# Patient Record
Sex: FEMALE | Race: WHITE | Employment: OTHER | URBAN - METROPOLITAN AREA
[De-identification: names, ages, dates, MRNs, and addresses within clinical notes are randomized per-mention and may not be internally consistent; named-entity substitution may affect disease eponyms.]

---

## 2022-08-24 ENCOUNTER — TELEPHONE (OUTPATIENT)
Dept: NEUROLOGY | Age: 77
End: 2022-08-24

## 2022-08-24 ENCOUNTER — OFFICE VISIT (OUTPATIENT)
Dept: NEUROLOGY | Age: 77
End: 2022-08-24
Payer: MEDICARE

## 2022-08-24 VITALS
HEIGHT: 66 IN | HEART RATE: 71 BPM | SYSTOLIC BLOOD PRESSURE: 135 MMHG | DIASTOLIC BLOOD PRESSURE: 76 MMHG | BODY MASS INDEX: 27.8 KG/M2 | OXYGEN SATURATION: 97 % | TEMPERATURE: 97.8 F | RESPIRATION RATE: 18 BRPM | WEIGHT: 173 LBS

## 2022-08-24 DIAGNOSIS — R55 SYNCOPE AND COLLAPSE: ICD-10-CM

## 2022-08-24 DIAGNOSIS — G90.9 ANS (AUTONOMIC NERVOUS SYSTEM) DISEASE: ICD-10-CM

## 2022-08-24 DIAGNOSIS — R40.20 LOC (LOSS OF CONSCIOUSNESS) (HCC): Primary | ICD-10-CM

## 2022-08-24 PROCEDURE — 1090F PRES/ABSN URINE INCON ASSESS: CPT | Performed by: PSYCHIATRY & NEUROLOGY

## 2022-08-24 PROCEDURE — G8417 CALC BMI ABV UP PARAM F/U: HCPCS | Performed by: PSYCHIATRY & NEUROLOGY

## 2022-08-24 PROCEDURE — G8536 NO DOC ELDER MAL SCRN: HCPCS | Performed by: PSYCHIATRY & NEUROLOGY

## 2022-08-24 PROCEDURE — G8510 SCR DEP NEG, NO PLAN REQD: HCPCS | Performed by: PSYCHIATRY & NEUROLOGY

## 2022-08-24 PROCEDURE — G8399 PT W/DXA RESULTS DOCUMENT: HCPCS | Performed by: PSYCHIATRY & NEUROLOGY

## 2022-08-24 PROCEDURE — G8427 DOCREV CUR MEDS BY ELIG CLIN: HCPCS | Performed by: PSYCHIATRY & NEUROLOGY

## 2022-08-24 PROCEDURE — 1101F PT FALLS ASSESS-DOCD LE1/YR: CPT | Performed by: PSYCHIATRY & NEUROLOGY

## 2022-08-24 PROCEDURE — 1123F ACP DISCUSS/DSCN MKR DOCD: CPT | Performed by: PSYCHIATRY & NEUROLOGY

## 2022-08-24 PROCEDURE — 99204 OFFICE O/P NEW MOD 45 MIN: CPT | Performed by: PSYCHIATRY & NEUROLOGY

## 2022-08-24 RX ORDER — ELECTROLYTES/DEXTROSE
SOLUTION, ORAL ORAL
COMMUNITY

## 2022-08-24 RX ORDER — ATORVASTATIN CALCIUM 10 MG/1
TABLET, FILM COATED ORAL
COMMUNITY
Start: 2022-07-27

## 2022-08-24 RX ORDER — ACETAMINOPHEN 500 MG
TABLET ORAL DAILY
COMMUNITY

## 2022-08-24 NOTE — PATIENT INSTRUCTIONS
RESULT POLICY      If we have ordered testing for you, know that; \"NO NEWS IS GOOD NEWS! \"     It is our policy that we no longer call patients with results, nor do we  give test results over the phone. We schedule follow up appointments so that your results can be discussed in person. This allows you to address any questions you have regarding the results. If you choose to go to an imaging center outside of [facility], it is your responsibility to bring imaging report and disc to follow up appointment. If something of concern is revealed on your test, we will contact you to discuss the matter and if needed schedule a sooner follow up appointment. Additionally, results may be found by using the My Chart feature and one of our patient service representatives at the  can give you instructions on how to access this feature to utilize our electronic medical record system. Thank you for your understanding. [facility]   Statement to Patients  April 1, 2014      In an effort to ensure the large volume of patient prescription refills is processed in the most efficient and expeditious manner, we are asking our patients to assist us by calling your Pharmacy for all prescription refills, this will include also your  Mail Order Pharmacy. The pharmacy will contact our office electronically to continue the refill process. Please do not wait until the last minute to call your pharmacy. We need at least 48 hours (2days) to fill prescriptions. We also encourage you to call your pharmacy before going to  your prescription to make sure it is ready. With regard to controlled substance prescription refill requests (narcotic refills) that need to be picked up at our office, we ask your cooperation by providing us with at least 72 hours (3days) notice that you will need a refill.     We will not refill narcotic prescription refill requests after 4:00pm on any weekday, Monday through Thursday, or after 2:00pm on Fridays, or on the weekends. We encourage everyone to explore another way of getting your prescription refill request processed using [employer], our patient web portal through our electronic medical record system. [employer] is an efficient and effective way to communicate your medication request directly to the office and  downloadable as an jessica on your smart phone . [employer] also features a review functionality that allows you to view your medication list as well as leave messages for your physician. Are you ready to get connected? If so please review the attatched instructions or speak to any of our staff to get you set up right away! Thank you so much for your cooperation. Should you have any questions please contact our Practice Administrator.
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
## 2022-09-02 ENCOUNTER — HOSPITAL ENCOUNTER (OUTPATIENT)
Dept: MRI IMAGING | Age: 77
Discharge: HOME | End: 2022-09-02
Attending: PSYCHIATRY & NEUROLOGY
Payer: MEDICARE

## 2022-09-02 DIAGNOSIS — R40.20 LOC (LOSS OF CONSCIOUSNESS) (HCC): ICD-10-CM

## 2022-09-02 DIAGNOSIS — G90.9 ANS (AUTONOMIC NERVOUS SYSTEM) DISEASE: ICD-10-CM

## 2022-09-02 DIAGNOSIS — R55 SYNCOPE AND COLLAPSE: ICD-10-CM

## 2022-09-02 PROCEDURE — 74011250636 HC RX REV CODE- 250/636: Performed by: PSYCHIATRY & NEUROLOGY

## 2022-09-02 PROCEDURE — 70553 MRI BRAIN STEM W/O & W/DYE: CPT

## 2022-09-02 PROCEDURE — A9576 INJ PROHANCE MULTIPACK: HCPCS | Performed by: PSYCHIATRY & NEUROLOGY

## 2022-09-02 RX ADMIN — GADOTERIDOL 15 ML: 279.3 INJECTION, SOLUTION INTRAVENOUS at 12:48

## 2022-09-12 ENCOUNTER — OFFICE VISIT (OUTPATIENT)
Dept: NEUROLOGY | Age: 77
End: 2022-09-12
Payer: MEDICARE

## 2022-09-12 DIAGNOSIS — R55 SYNCOPE AND COLLAPSE: Primary | ICD-10-CM

## 2022-09-12 PROCEDURE — 95923 AUTONOMIC NRV SYST FUNJ TEST: CPT | Performed by: PSYCHIATRY & NEUROLOGY

## 2022-09-12 PROCEDURE — 95924 ANS PARASYMP & SYMP W/TILT: CPT | Performed by: PSYCHIATRY & NEUROLOGY

## 2022-09-21 ENCOUNTER — OFFICE VISIT (OUTPATIENT)
Dept: NEUROLOGY | Age: 77
End: 2022-09-21

## 2022-09-21 DIAGNOSIS — R55 SYNCOPE AND COLLAPSE: Primary | ICD-10-CM

## 2022-10-06 ENCOUNTER — TELEPHONE (OUTPATIENT)
Dept: NEUROLOGY | Age: 77
End: 2022-10-06

## 2022-10-06 NOTE — TELEPHONE ENCOUNTER
Patient would like a call form the nurse or doctor regarding her going away on October 25 th the day after her follow up appointment. She would like to know ahead of time should she fly by herself.     Please contact

## 2022-10-08 NOTE — PROCEDURES
[facility]   EEG Report    Procedure ID: [id number] Procedure Date: 9/21/2022   Patient Name: Steffany Fleming YOB: 1945   Procedure Type: Routine Medical Record No: [id number]       An EEG is requested in this 65-year-old lady to evaluate for epileptiform abnormalities. Medication listed as Lipitor, biotin and vitamin D    This tracing is obtained during the awake state. During wakefulness there are intermittent runs of posteriorly dominant and symmetric low to medium amplitude 9 cps activities which attenuate with eye opening. Lower voltage faster frequency activities are seen symmetrically over the anterior head regions. Hyperventilation is not performed    Intermittent photic stimulation little alters the tracing. Sleep is not attained.     Interpretation  This EEG recorded during the awake state is normal.        Conan Lesch, MD

## 2022-10-10 NOTE — TELEPHONE ENCOUNTER
Patient is calling back to see when will she get a call back regarding he flying In an airplane.     Please contact

## 2022-10-24 ENCOUNTER — OFFICE VISIT (OUTPATIENT)
Dept: NEUROLOGY | Age: 77
End: 2022-10-24
Payer: MEDICARE

## 2022-10-24 VITALS
OXYGEN SATURATION: 98 % | SYSTOLIC BLOOD PRESSURE: 120 MMHG | DIASTOLIC BLOOD PRESSURE: 82 MMHG | WEIGHT: 173 LBS | BODY MASS INDEX: 27.92 KG/M2 | HEART RATE: 83 BPM

## 2022-10-24 DIAGNOSIS — R40.20 LOC (LOSS OF CONSCIOUSNESS) (HCC): Primary | ICD-10-CM

## 2022-10-24 PROCEDURE — G8417 CALC BMI ABV UP PARAM F/U: HCPCS | Performed by: NURSE PRACTITIONER

## 2022-10-24 PROCEDURE — 99214 OFFICE O/P EST MOD 30 MIN: CPT | Performed by: NURSE PRACTITIONER

## 2022-10-24 PROCEDURE — G8427 DOCREV CUR MEDS BY ELIG CLIN: HCPCS | Performed by: NURSE PRACTITIONER

## 2022-10-24 PROCEDURE — 1123F ACP DISCUSS/DSCN MKR DOCD: CPT | Performed by: NURSE PRACTITIONER

## 2022-10-24 PROCEDURE — G8536 NO DOC ELDER MAL SCRN: HCPCS | Performed by: NURSE PRACTITIONER

## 2022-10-24 PROCEDURE — G8399 PT W/DXA RESULTS DOCUMENT: HCPCS | Performed by: NURSE PRACTITIONER

## 2022-10-24 PROCEDURE — 1101F PT FALLS ASSESS-DOCD LE1/YR: CPT | Performed by: NURSE PRACTITIONER

## 2022-10-24 PROCEDURE — 1090F PRES/ABSN URINE INCON ASSESS: CPT | Performed by: NURSE PRACTITIONER

## 2022-10-24 PROCEDURE — G8432 DEP SCR NOT DOC, RNG: HCPCS | Performed by: NURSE PRACTITIONER

## 2022-10-24 NOTE — PROGRESS NOTES
No changes in symptoms   Wanted to mention that her eyes are doing a random motions, shaking back and forth will only be one eye

## 2022-10-25 NOTE — PROGRESS NOTES
Hemant Vences is a 65 y.o. female who presents with the following  Chief Complaint   Patient presents with    Follow-up    Results       HPI      Follow-up for MRI of the brain  EEG  Carotid Doppler  ANS testing    She has not had any other syncopal episodes or dizziness or passing out since early in the summer  She is still following with cardiology with nothing has been found  She feels great today  She is traveling tomorrow  No concerns right now    No Known Allergies    Current Outpatient Medications   Medication Sig    atorvastatin (LIPITOR) 10 mg tablet     biotin (VITAMIN B7) 5 mg capsule Take  by mouth. cholecalciferol (VITAMIN D3) (2,000 UNITS /50 MCG) cap capsule Take  by mouth daily. No current facility-administered medications for this visit. Social History     Tobacco Use   Smoking Status Never   Smokeless Tobacco Never       Past Medical History:   Diagnosis Date    Basal cell carcinoma (BCC) in situ of skin     High cholesterol     Hypertension        Past Surgical History:   Procedure Laterality Date    HX COLONOSCOPY  2021       Family History   Problem Relation Age of Onset    Heart Disease Mother     Throat cancer Father     Emphysema Father     Breast Cancer Sister     Colon Cancer Sister     Cancer Sister         tonsils    Cancer Sister         tonsils    Hypertension Brother     SKIN CANCER Brother        Social History     Socioeconomic History    Marital status:    Tobacco Use    Smoking status: Never    Smokeless tobacco: Never   Vaping Use    Vaping Use: Never used   Substance and Sexual Activity    Alcohol use: Never    Drug use: Never       Review of Systems   Eyes:  Negative for blurred vision, double vision and photophobia. Gastrointestinal:  Negative for nausea and vomiting. Musculoskeletal:  Negative for back pain, falls and joint pain. Neurological:  Negative for dizziness, tingling, tremors, seizures, loss of consciousness and headaches.      Remainder of comprehensive review is negative. Physical Exam :    Visit Vitals  /82 (BP 1 Location: Left upper arm, BP Patient Position: Sitting, BP Cuff Size: Adult)   Pulse 83   Wt 78.5 kg (173 lb)   SpO2 98%   BMI 27.92 kg/m²         No results found for this or any previous visit. No orders of the defined types were placed in this encounter.       1. LOC (loss of consciousness) ([address]          MRI of the brain, EEG, Doppler, ANS all normal  We did discuss small fiber neuropathy on ANS though and she has no concerns about this as this is not the cause this was an incidental finding  She has not had a syncopal episode in months  She has been following with cardiology with no updates   We will reevaluate with further testing if another comes            This note will not be viewable in [facility]

## 2023-01-09 ENCOUNTER — TELEPHONE (OUTPATIENT)
Dept: NEUROLOGY | Age: 78
End: 2023-01-09

## 2023-01-09 DIAGNOSIS — R55 SYNCOPE AND COLLAPSE: ICD-10-CM

## 2023-01-09 DIAGNOSIS — R40.20 LOC (LOSS OF CONSCIOUSNESS) (HCC): Primary | ICD-10-CM

## 2023-01-09 NOTE — TELEPHONE ENCOUNTER
Patient called re:short reoccuring of \"blackout/losing consciouness \"for a very, very short period of time. Pls call patient concerning this issue. Thank you.

## 2023-01-23 ENCOUNTER — HOSPITAL ENCOUNTER (OUTPATIENT)
Age: 78
Setting detail: OBSERVATION
LOS: 1 days | Discharge: HOME | End: 2023-01-26
Attending: PSYCHIATRY & NEUROLOGY | Admitting: PSYCHIATRY & NEUROLOGY
Payer: MEDICARE

## 2023-01-23 ENCOUNTER — APPOINTMENT (OUTPATIENT)
Dept: NEUROLOGY | Age: 78
End: 2023-01-23
Payer: MEDICARE

## 2023-01-23 PROBLEM — R40.4 SPELL OF ALTERED CONSCIOUSNESS: Status: ACTIVE | Noted: 2023-01-23

## 2023-01-23 LAB
ALBUMIN SERPL-MCNC: 3.7 G/DL (ref 3.5–5)
ALBUMIN/GLOB SERPL: 1.2 (ref 1.1–2.2)
ALP SERPL-CCNC: 81 U/L (ref 45–117)
ALT SERPL-CCNC: 25 U/L (ref 12–78)
AMPHET UR QL SCN: NEGATIVE
ANION GAP SERPL CALC-SCNC: 5 MMOL/L (ref 5–15)
AST SERPL-CCNC: 14 U/L (ref 15–37)
BARBITURATES UR QL SCN: NEGATIVE
BASOPHILS # BLD: 0 K/UL (ref 0–0.1)
BASOPHILS NFR BLD: 0 % (ref 0–1)
BENZODIAZ UR QL: NEGATIVE
BILIRUB SERPL-MCNC: 0.3 MG/DL (ref 0.2–1)
BUN SERPL-MCNC: 17 MG/DL (ref 6–20)
BUN/CREAT SERPL: 20 (ref 12–20)
CALCIUM SERPL-MCNC: 9.9 MG/DL (ref 8.5–10.1)
CANNABINOIDS UR QL SCN: NEGATIVE
CHLORIDE SERPL-SCNC: 111 MMOL/L (ref 97–108)
CO2 SERPL-SCNC: 25 MMOL/L (ref 21–32)
COCAINE UR QL SCN: NEGATIVE
CREAT SERPL-MCNC: 0.83 MG/DL (ref 0.55–1.02)
DIFFERENTIAL METHOD BLD: NORMAL
DRUG SCRN COMMENT,DRGCM: NORMAL
EOSINOPHIL # BLD: 0.1 K/UL (ref 0–0.4)
EOSINOPHIL NFR BLD: 2 % (ref 0–7)
ERYTHROCYTE [DISTWIDTH] IN BLOOD BY AUTOMATED COUNT: 11.9 % (ref 11.5–14.5)
GLOBULIN SER CALC-MCNC: 3.2 G/DL (ref 2–4)
GLUCOSE SERPL-MCNC: 87 MG/DL (ref 65–100)
HCT VFR BLD AUTO: 41.9 % (ref 35–47)
HGB BLD-MCNC: 13.9 G/DL (ref 11.5–16)
IMM GRANULOCYTES # BLD AUTO: 0 K/UL (ref 0–0.04)
IMM GRANULOCYTES NFR BLD AUTO: 0 % (ref 0–0.5)
LYMPHOCYTES # BLD: 1.2 K/UL (ref 0.8–3.5)
LYMPHOCYTES NFR BLD: 23 % (ref 12–49)
MCH RBC QN AUTO: 31.2 PG (ref 26–34)
MCHC RBC AUTO-ENTMCNC: 33.2 G/DL (ref 30–36.5)
MCV RBC AUTO: 93.9 FL (ref 80–99)
METHADONE UR QL: NEGATIVE
MONOCYTES # BLD: 0.5 K/UL (ref 0–1)
MONOCYTES NFR BLD: 10 % (ref 5–13)
NEUTS SEG # BLD: 3.5 K/UL (ref 1.8–8)
NEUTS SEG NFR BLD: 65 % (ref 32–75)
NRBC # BLD: 0 K/UL (ref 0–0.01)
NRBC BLD-RTO: 0 PER 100 WBC
OPIATES UR QL: NEGATIVE
PCP UR QL: NEGATIVE
PLATELET # BLD AUTO: 248 K/UL (ref 150–400)
PMV BLD AUTO: 10.6 FL (ref 8.9–12.9)
POTASSIUM SERPL-SCNC: 4 MMOL/L (ref 3.5–5.1)
PROT SERPL-MCNC: 6.9 G/DL (ref 6.4–8.2)
RBC # BLD AUTO: 4.46 M/UL (ref 3.8–5.2)
SODIUM SERPL-SCNC: 141 MMOL/L (ref 136–145)
WBC # BLD AUTO: 5.4 K/UL (ref 3.6–11)

## 2023-01-23 PROCEDURE — 36415 COLL VENOUS BLD VENIPUNCTURE: CPT

## 2023-01-23 PROCEDURE — 80053 COMPREHEN METABOLIC PANEL: CPT

## 2023-01-23 PROCEDURE — G0378 HOSPITAL OBSERVATION PER HR: HCPCS

## 2023-01-23 PROCEDURE — 99223 1ST HOSP IP/OBS HIGH 75: CPT | Performed by: PSYCHIATRY & NEUROLOGY

## 2023-01-23 PROCEDURE — 95714 VEEG EA 12-26 HR UNMNTR: CPT | Performed by: PSYCHIATRY & NEUROLOGY

## 2023-01-23 PROCEDURE — 85025 COMPLETE CBC W/AUTO DIFF WBC: CPT

## 2023-01-23 PROCEDURE — 74011000250 HC RX REV CODE- 250: Performed by: PSYCHIATRY & NEUROLOGY

## 2023-01-23 PROCEDURE — 80307 DRUG TEST PRSMV CHEM ANLYZR: CPT

## 2023-01-23 PROCEDURE — 95720 EEG PHY/QHP EA INCR W/VEEG: CPT | Performed by: PSYCHIATRY & NEUROLOGY

## 2023-01-23 RX ORDER — ACETAMINOPHEN 325 MG/1
650 TABLET ORAL
Status: DISCONTINUED | OUTPATIENT
Start: 2023-01-23 | End: 2023-01-26 | Stop reason: HOSPADM

## 2023-01-23 RX ORDER — LORAZEPAM 2 MG/ML
2 INJECTION, SOLUTION INTRAMUSCULAR; INTRAVENOUS
Status: DISCONTINUED | OUTPATIENT
Start: 2023-01-23 | End: 2023-01-26 | Stop reason: HOSPADM

## 2023-01-23 RX ORDER — MELATONIN
2000 DAILY
Status: DISCONTINUED | OUTPATIENT
Start: 2023-01-24 | End: 2023-01-26 | Stop reason: HOSPADM

## 2023-01-23 RX ORDER — ATORVASTATIN CALCIUM 10 MG/1
10 TABLET, FILM COATED ORAL DAILY
Status: DISCONTINUED | OUTPATIENT
Start: 2023-01-24 | End: 2023-01-26 | Stop reason: HOSPADM

## 2023-01-23 RX ORDER — SODIUM CHLORIDE 0.9 % (FLUSH) 0.9 %
5-40 SYRINGE (ML) INJECTION AS NEEDED
Status: DISCONTINUED | OUTPATIENT
Start: 2023-01-23 | End: 2023-01-26 | Stop reason: HOSPADM

## 2023-01-23 RX ORDER — SODIUM CHLORIDE 0.9 % (FLUSH) 0.9 %
5-40 SYRINGE (ML) INJECTION EVERY 8 HOURS
Status: DISCONTINUED | OUTPATIENT
Start: 2023-01-23 | End: 2023-01-26 | Stop reason: HOSPADM

## 2023-01-23 RX ORDER — ENOXAPARIN SODIUM 100 MG/ML
40 INJECTION SUBCUTANEOUS EVERY 24 HOURS
Status: DISCONTINUED | OUTPATIENT
Start: 2023-01-23 | End: 2023-01-26 | Stop reason: HOSPADM

## 2023-01-23 RX ADMIN — SODIUM CHLORIDE, PRESERVATIVE FREE 10 ML: 5 INJECTION INTRAVENOUS at 22:00

## 2023-01-23 RX ADMIN — SODIUM CHLORIDE, PRESERVATIVE FREE 10 ML: 5 INJECTION INTRAVENOUS at 18:50
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
## 2023-01-23 NOTE — PROGRESS NOTES
[address] [facility],[address]      Herbal and Nutritional Product Restrictions       The following herbal, alternative, and/or nutritional/dietary supplement product(s) has been discontinued per [employer] approved policy:       Biotin 5 mg     Please reorder upon discharge if appropriate.      Thank you, KILLIAN Seay    1/23/2023 11:44 AM

## 2023-01-23 NOTE — PROGRESS NOTES
Problem: Discharge Planning  Goal: *Discharge to safe environment  Outcome: Progressing Towards Goal  Goal: *Knowledge of medication management  Outcome: Progressing Towards Goal  Goal: *Knowledge of discharge instructions  Outcome: Progressing Towards Goal     Problem: Patient Education: Go to Patient Education Activity  Goal: Patient/Family Education  Outcome: Progressing Towards Goal     Problem: Pain  Goal: *Control of Pain  Outcome: Progressing Towards Goal  Goal: *PALLIATIVE CARE:  Alleviation of Pain  Outcome: Progressing Towards Goal     Problem: Patient Education: Go to Patient Education Activity  Goal: Patient/Family Education  Outcome: Progressing Towards Goal     Problem: Seizure Disorder (Adult)  Goal: *STG: Remains free of seizure activity  Outcome: Progressing Towards Goal  Goal: *STG: Maintains lab values within therapeutic range  Outcome: Progressing Towards Goal  Goal: *STG/LTG: Complies with medication therapy  Outcome: Progressing Towards Goal  Goal: *STG: Remains free of injury during seizure activity  Outcome: Progressing Towards Goal  Goal: *STG: Remains safe in hospital  Outcome: Progressing Towards Goal  Goal: Interventions  Outcome: Progressing Towards Goal     Problem: Patient Education: Go to Patient Education Activity  Goal: Patient/Family Education  Outcome: Progressing Towards Goal

## 2023-01-24 PROCEDURE — 99231 SBSQ HOSP IP/OBS SF/LOW 25: CPT | Performed by: PSYCHIATRY & NEUROLOGY

## 2023-01-24 PROCEDURE — G0378 HOSPITAL OBSERVATION PER HR: HCPCS

## 2023-01-24 PROCEDURE — 95714 VEEG EA 12-26 HR UNMNTR: CPT | Performed by: PSYCHIATRY & NEUROLOGY

## 2023-01-24 PROCEDURE — 74011000250 HC RX REV CODE- 250: Performed by: PSYCHIATRY & NEUROLOGY

## 2023-01-24 PROCEDURE — 74011250637 HC RX REV CODE- 250/637: Performed by: PSYCHIATRY & NEUROLOGY

## 2023-01-24 PROCEDURE — 95720 EEG PHY/QHP EA INCR W/VEEG: CPT | Performed by: PSYCHIATRY & NEUROLOGY

## 2023-01-24 RX ADMIN — Medication 2000 UNITS: at 09:32

## 2023-01-24 RX ADMIN — SODIUM CHLORIDE, PRESERVATIVE FREE 10 ML: 5 INJECTION INTRAVENOUS at 14:08

## 2023-01-24 RX ADMIN — ATORVASTATIN CALCIUM 10 MG: 10 TABLET, FILM COATED ORAL at 09:32

## 2023-01-24 RX ADMIN — SODIUM CHLORIDE, PRESERVATIVE FREE 10 ML: 5 INJECTION INTRAVENOUS at 06:37

## 2023-01-24 NOTE — PROGRESS NOTES
Provided pastoral care visit to [facility] patient. Did not include sacramental care.      Pravin Mcbride

## 2023-01-24 NOTE — PROGRESS NOTES
Problem: Discharge Planning  Goal: *Knowledge of medication management  Outcome: Progressing Towards Goal  Goal: *Knowledge of discharge instructions  Outcome: Progressing Towards Goal     Problem: Patient Education: Go to Patient Education Activity  Goal: Patient/Family Education  Outcome: Progressing Towards Goal     Problem: Pain  Goal: *Control of Pain  Outcome: Progressing Towards Goal

## 2023-01-24 NOTE — PROGRESS NOTES
Neurology Progress Note     NAME: Connor Callaway   :  [id number]   MRN:  [id number]   DATE:  2023    Assessment:     Active Problems:    Spell of altered consciousness (2023)      Pt is a 67yo RH female with recurrent brief spells of RIK/LOC beginning in , +family h/o sz in her neice. 24 hour ambulatory EEG 22 was normal. MRI brain w/wo 22 was unremarkable. Pt is admitted to the EMU for continuous video EEG monitoring with cardiac monitoring in order to capture a spell and make a definitive diagnosis. Exam is non-focal.   No spells on day 1 in EMU, interictal EEG is unremarkable. CMP, CBC with diff and UDS all unremarkable. Spells of RIK/LOC concerning for seizure vs NATHANAEL such as arrhythmia. Plan:   -Continue video EEG monitoring  -Continue sleep deprivation through dinner on day 2    Subjective:    Pt is doing well. No complaints. No spells.     Objective:   Chart reviewed since last seen    Current Facility-Administered Medications   Medication Dose Route Frequency    sodium chloride (NS) flush 5-40 mL  5-40 mL IntraVENous Q8H    sodium chloride (NS) flush 5-40 mL  5-40 mL IntraVENous PRN    LORazepam (ATIVAN) 2 mg/mL injection 2 mg  2 mg IntraVENous Q5MIN PRN    LORazepam (ATIVAN) 2 mg/mL injection 2 mg  2 mg IntraMUSCular Q5MIN PRN    acetaminophen (TYLENOL) tablet 650 mg  650 mg Oral Q4H PRN    enoxaparin (LOVENOX) injection 40 mg  40 mg SubCUTAneous Q24H    atorvastatin (LIPITOR) tablet 10 mg  10 mg Oral DAILY    cholecalciferol (VITAMIN D3) (1000 Units /25 mcg) tablet 2,000 Units  2,000 Units Oral DAILY       Visit Vitals  /75 (BP 1 Location: Left upper arm, BP Patient Position: Sitting)   Pulse 72   Temp 98.1 °F (36.7 °C)   Resp 15   Wt 171 lb (77.6 kg)   SpO2 99%   BMI 27.60 kg/m²     Temp (24hrs), Av.3 °F (36.8 °C), Min:97.9 °F (36.6 °C), Max:98.7 °F (37.1 °C)      No intake/output data recorded. 01/22 1901 - 01/24 0700  In: 240 [P.O.:240]  Out: -       Physical Exam:  General: Well developed well nourished patient in no apparent distress. Cardiac: Regular rate and rhythm     Neurological Exam:  Mental Status: Oriented to time, place and person. Speech and language intact. Attention and fund of knowledge appropriate. Normal recent and remote memory. Cranial Nerves:   EOMI, no nystagmus, no ptosis. Facial movement is symmetric. Hearing is intact   Motor:     Reflexes:      Sensory:      Gait:     Cerebellar:           Lab Review No results found for this or any previous visit (from the past 24 hour(s)). Imaging Review   MRI Results (most recent):  Results from Hospital Encounter encounter on 09/02/22    MRI BRAIN W WO CONT    Narrative  Clinical history: Recurrent episodes of syncope  INDICATION:   Recurrent episodes of syncope    COMPARISON: None  TECHNIQUE: MR examination of the brain includes axial and sagittal T1 , axial  T2, axial FLAIR, axial gradient echo, axial DWI, coronal T1 . Pre and post  contrast axial T1-weighted imaging. Postcontrast T1-weighted imaging coronal  plane. CONTRAST: 20 ml ProHance  FINDINGS:  There is no intracranial mass, hemorrhage or acute infarction. There are confluent periventricular and scattered foci of increased T2 signal  intensity demonstrated in the corona radiata, centrum semiovale and central  mary beth. There is minimal sulcal and ventricular prominence. There is no abnormal parenchymal enhancement. There is no abnormal meningeal  enhancement demonstrated. The brain architecture is normal. There is no evidence  of midline shift or mass-effect. The ventricles are normal in size, position and  configuration. There are no extra-axial fluid collections. Major intracranial  vascular flow-voids are unremarkable. The orbits are grossly symmetric.  Dural  venous sinuses are grossly unremarkable. There is no Chiari or syrinx. Pituitary  and infundibulum grossly unremarkable. Cerebellopontine angles are unremarkable. No skull base mass is identified. Cavernous sinuses are symmetric. The mastoid  air cells and are well pneumatized and clear. Impression  Mild/minimal chronic microvascular ischemic change and minimal cerebral atrophy. There is no acute intracranial process. No intracranial mass, hemorrhage or evidence of acute infarction. CT Results (most recent):  No results found for this or any previous visit. Care Plan discussed with:  Patient x   Family    RN    Care Manager    Consultant/Specialist:       Signed: Cristhian Zaragoza MD

## 2023-01-25 PROCEDURE — 74011250637 HC RX REV CODE- 250/637: Performed by: PSYCHIATRY & NEUROLOGY

## 2023-01-25 PROCEDURE — 95714 VEEG EA 12-26 HR UNMNTR: CPT | Performed by: PSYCHIATRY & NEUROLOGY

## 2023-01-25 PROCEDURE — G0378 HOSPITAL OBSERVATION PER HR: HCPCS

## 2023-01-25 PROCEDURE — 95720 EEG PHY/QHP EA INCR W/VEEG: CPT | Performed by: PSYCHIATRY & NEUROLOGY

## 2023-01-25 PROCEDURE — 74011000250 HC RX REV CODE- 250: Performed by: PSYCHIATRY & NEUROLOGY

## 2023-01-25 PROCEDURE — 99231 SBSQ HOSP IP/OBS SF/LOW 25: CPT | Performed by: PSYCHIATRY & NEUROLOGY

## 2023-01-25 RX ADMIN — SODIUM CHLORIDE, PRESERVATIVE FREE 10 ML: 5 INJECTION INTRAVENOUS at 06:45

## 2023-01-25 RX ADMIN — ATORVASTATIN CALCIUM 10 MG: 10 TABLET, FILM COATED ORAL at 09:26

## 2023-01-25 RX ADMIN — SODIUM CHLORIDE, PRESERVATIVE FREE 10 ML: 5 INJECTION INTRAVENOUS at 22:00

## 2023-01-25 RX ADMIN — SODIUM CHLORIDE, PRESERVATIVE FREE 10 ML: 5 INJECTION INTRAVENOUS at 13:46

## 2023-01-25 RX ADMIN — Medication 2000 UNITS: at 09:26

## 2023-01-25 NOTE — PROGRESS NOTES
Spiritual Care Assessment/Progress Note  [facility]      NAME: Angeline Pineda      MRN: [id number]  AGE: 65 y.o.  SEX: female  Buddhist Affiliation: Adventism   Language: English     1/25/2023     Total Time (in minutes): 5     Spiritual Assessment begun in [address] through conversation with:         [x]Patient        [] Family    [] Friend(s)        Reason for Consult: [facility]     Spiritual beliefs: (Please include comment if needed)     [x] Identifies with a alphonse tradition: Adventism        [] Supported by a alphonse community:            [] Claims no spiritual orientation:           [] Seeking spiritual identity:                [] Adheres to an individual form of spirituality:           [] Not able to assess:                           Identified resources for coping:      [x] Prayer                               [x] Music                  [] Guided Imagery     [x] Family/friends                 [] Pet visits     [] Devotional reading                         [] Unknown     [] Other:                                               Interventions offered during this visit: (See comments for more details)    Patient Interventions: Affirmation of alphonse, Communion (Adventism), Initial/Spiritual assessment, patient floor, Prayer (actual), Prayer (assurance of)           Plan of Care:     [x] Support spiritual and/or cultural needs    [] Support AMD and/or advance care planning process      [] Support grieving process   [] Coordinate Rites and/or Rituals    [] Coordination with community clergy   [] No spiritual needs identified at this time   [] Detailed Plan of Care below (See Comments)  [] Make referral to Music Therapy  [] Make referral to Pet Therapy     [] Make referral to Addiction services  [] Make referral to [facility]  [] Make referral to Spiritual Care Partner  [] No future visits requested        [] Contact Spiritual Care for further referrals     Comments: Mrs. Milena Wright was sitting up in her chair and in conversation with the nurse. She declined communion today. She is waiting for the doctor and hoping to go home this afternoon.     RENNY Tillman, RN, ACSW, LCSW   Page:  [phone]

## 2023-01-25 NOTE — PROGRESS NOTES
Problem: Seizure Disorder (Adult)  Goal: *STG: Remains free of seizure activity  Outcome: Progressing Towards Goal  Goal: *STG: Maintains lab values within therapeutic range  Outcome: Progressing Towards Goal  Goal: *STG/LTG: Complies with medication therapy  Outcome: Progressing Towards Goal  Goal: *STG: Remains free of injury during seizure activity  Outcome: Progressing Towards Goal  Goal: *STG: Remains safe in hospital  Outcome: Progressing Towards Goal  Goal: Interventions  Outcome: Progressing Towards Goal     Problem: Patient Education: Go to Patient Education Activity  Goal: Patient/Family Education  Outcome: Progressing Towards Goal     Problem: Falls - Risk of  Goal: *Absence of Falls  Description: Document Huseyin Fall Risk and appropriate interventions in the flowsheet.   Outcome: Progressing Towards Goal  Note: Fall Risk Interventions:                                Problem: Patient Education: Go to Patient Education Activity  Goal: Patient/Family Education  Outcome: Progressing Towards Goal

## 2023-01-25 NOTE — PROGRESS NOTES
Bedside and Verbal shift change report given to [facility] (oncoming nurse) by Carmela Merritt RN (offgoing nurse). Report included the following information Kardex and Recent Results.

## 2023-01-25 NOTE — PROGRESS NOTES
0000 Bedside and Verbal shift change report given to Elvin (oncoming nurse) by Charisma Sweeney (offgoing nurse). Report included the following information SBAR, Kardex, and MAR.     0730 Bedside and Verbal shift change report given to Krystal (oncoming nurse) by Gabrielle Juárez (offgoing nurse). Report included the following information SBAR, Kardex, and MAR.        Problem: Seizure Disorder (Adult)  Goal: *STG: Remains safe in hospital  Outcome: Progressing Towards Goal

## 2023-01-26 VITALS
BODY MASS INDEX: 27.6 KG/M2 | SYSTOLIC BLOOD PRESSURE: 156 MMHG | HEART RATE: 67 BPM | WEIGHT: 171 LBS | OXYGEN SATURATION: 97 % | DIASTOLIC BLOOD PRESSURE: 80 MMHG | RESPIRATION RATE: 19 BRPM | TEMPERATURE: 98 F

## 2023-01-26 PROCEDURE — 95718 EEG PHYS/QHP 2-12 HR W/VEEG: CPT | Performed by: PSYCHIATRY & NEUROLOGY

## 2023-01-26 PROCEDURE — 99238 HOSP IP/OBS DSCHRG MGMT 30/<: CPT | Performed by: PSYCHIATRY & NEUROLOGY

## 2023-01-26 PROCEDURE — G0378 HOSPITAL OBSERVATION PER HR: HCPCS

## 2023-01-26 PROCEDURE — 74011250637 HC RX REV CODE- 250/637: Performed by: PSYCHIATRY & NEUROLOGY

## 2023-01-26 RX ORDER — LEVETIRACETAM 500 MG/1
500 TABLET ORAL 2 TIMES DAILY
Qty: 180 TABLET | Refills: 1 | Status: SHIPPED | OUTPATIENT
Start: 2023-01-26

## 2023-01-26 RX ADMIN — Medication 2000 UNITS: at 08:14

## 2023-01-26 RX ADMIN — ATORVASTATIN CALCIUM 10 MG: 10 TABLET, FILM COATED ORAL at 08:15

## 2023-01-26 NOTE — PROGRESS NOTES
Problem: Discharge Planning  Goal: *Discharge to safe environment  Outcome: Progressing Towards Goal  Goal: *Knowledge of medication management  Outcome: Progressing Towards Goal  Goal: *Knowledge of discharge instructions  Outcome: Progressing Towards Goal     Problem: Pain  Goal: *Control of Pain  Outcome: Progressing Towards Goal  Goal: *PALLIATIVE CARE:  Alleviation of Pain  Outcome: Progressing Towards Goal     Problem: Seizure Disorder (Adult)  Goal: *STG: Remains free of seizure activity  Outcome: Progressing Towards Goal  Goal: *STG: Maintains lab values within therapeutic range  Outcome: Progressing Towards Goal  Goal: *STG/LTG: Complies with medication therapy  Outcome: Progressing Towards Goal  Goal: *STG: Remains free of injury during seizure activity  Outcome: Progressing Towards Goal  Goal: *STG: Remains safe in hospital  Outcome: Progressing Towards Goal  Goal: Interventions  Outcome: Progressing Towards Goal

## 2023-01-26 NOTE — PROGRESS NOTES
Neurology Progress Note     NAME: Adolph Morse   :     MRN:  [id number]   DATE:  2023    Assessment:     Active Problems:    Spell of altered consciousness (2023)    Pt is a 67yo RH female with recurrent brief spells of RIK/LOC beginning in , +family h/o sz in her neice. 24 hour ambulatory EEG 22 was normal. MRI brain w/wo 22 was unremarkable. Pt is admitted to the EMU for continuous video EEG monitoring with cardiac monitoring in order to capture a spell and make a definitive diagnosis. Exam is non-focal.   No spells on day 2 in EMU, interictal EEG is unremarkable. Spells of RIK/LOC concerning for seizure vs NATHANAEL such as arrhythmia. Plan:   -Continue video EEG monitoring  -Will stay up until 2AM tonight, then sleep until wakes in AM  -Discussed possibly starting a diagnostic trial of an ASD if no events by tomorrow. Subjective:    Pt is seen with her  at bedside. She has been looking at driving simulations on the computer in an effort to trigger a spell. No complaints. No spells.     Objective:   Chart reviewed since last seen    Current Facility-Administered Medications   Medication Dose Route Frequency    sodium chloride (NS) flush 5-40 mL  5-40 mL IntraVENous Q8H    sodium chloride (NS) flush 5-40 mL  5-40 mL IntraVENous PRN    LORazepam (ATIVAN) 2 mg/mL injection 2 mg  2 mg IntraVENous Q5MIN PRN    LORazepam (ATIVAN) 2 mg/mL injection 2 mg  2 mg IntraMUSCular Q5MIN PRN    acetaminophen (TYLENOL) tablet 650 mg  650 mg Oral Q4H PRN    enoxaparin (LOVENOX) injection 40 mg  40 mg SubCUTAneous Q24H    atorvastatin (LIPITOR) tablet 10 mg  10 mg Oral DAILY    cholecalciferol (VITAMIN D3) (1000 Units /25 mcg) tablet 2,000 Units  2,000 Units Oral DAILY       Visit Vitals  /81 (BP 1 Location: Right upper arm, BP Patient Position: At rest)   Pulse 70   Temp 97.8 °F (36.6 °C)   Resp 18   Wt 171 lb (77.6 kg)   SpO2 98%   BMI 27.60 kg/m²     Temp (24hrs), Av.1 °F (36.7 °C), Min:97.8 °F (36.6 °C), Max:98.4 °F (36.9 °C)      No intake/output data recorded.  07 -  1900  In: 175 [P.O.:175]  Out: -       Physical Exam:  General: Well developed well nourished patient in no apparent distress. Cardiac: Regular rate and rhythm     Neurological Exam:  Mental Status: Oriented to time, place and person. Speech and language intact. Attention and fund of knowledge appropriate. Normal recent and remote memory. Cranial Nerves:   EOMI, no nystagmus, no ptosis. Facial movement is symmetric. Hearing is intact   Motor:     Reflexes:      Sensory:      Gait:     Cerebellar:           Lab Review No results found for this or any previous visit (from the past 24 hour(s)). Imaging Review   MRI Results (most recent):  Results from Hospital Encounter encounter on 22    MRI BRAIN W WO CONT    Narrative  Clinical history: Recurrent episodes of syncope  INDICATION:   Recurrent episodes of syncope    COMPARISON: None  TECHNIQUE: MR examination of the brain includes axial and sagittal T1 , axial  T2, axial FLAIR, axial gradient echo, axial DWI, coronal T1 . Pre and post  contrast axial T1-weighted imaging. Postcontrast T1-weighted imaging coronal  plane. CONTRAST: 20 ml ProHance  FINDINGS:  There is no intracranial mass, hemorrhage or acute infarction. There are confluent periventricular and scattered foci of increased T2 signal  intensity demonstrated in the corona radiata, centrum semiovale and central  mary beth. There is minimal sulcal and ventricular prominence. There is no abnormal parenchymal enhancement. There is no abnormal meningeal  enhancement demonstrated. The brain architecture is normal. There is no evidence  of midline shift or mass-effect. The ventricles are normal in size, position and  configuration.   There are no extra-axial fluid collections. Major intracranial  vascular flow-voids are unremarkable. The orbits are grossly symmetric. Dural  venous sinuses are grossly unremarkable. There is no Chiari or syrinx. Pituitary  and infundibulum grossly unremarkable. Cerebellopontine angles are unremarkable. No skull base mass is identified. Cavernous sinuses are symmetric. The mastoid  air cells and are well pneumatized and clear. Impression  Mild/minimal chronic microvascular ischemic change and minimal cerebral atrophy. There is no acute intracranial process. No intracranial mass, hemorrhage or evidence of acute infarction. CT Results (most recent):  No results found for this or any previous visit. Care Plan discussed with:  Patient x   Family    RN    Care Manager    Consultant/Specialist:       Signed: Latasha Encarnacion MD

## 2023-01-26 NOTE — PROGRESS NOTES
I have reviewed discharge instructions with the patient. The patient verbalized understanding. Discharge medications reviewed with patient and appropriate educational materials and side effects teaching were provided. Daughter will be taking pt home.

## 2023-01-26 NOTE — DISCHARGE SUMMARY
Epilepsy Monitoring Unit Neurology Discharge Summary     Patient ID:  Nelia Sanchez  [id number]  75 y.o.  1945    Admit Date: 1/23/2023    Discharge Date: 1/26/2023      Admission Diagnoses: Spells of loss of awareness    Discharge Diagnoses: Same    Disposition: home    Discharged Condition: good    Hospital Summary:  Pt is a 67yo RH female with recurrent spells of RIK/LOC beginning in 2016. Spells occur without warning, just \"goes out,\" very brief. When she comes to, she is immediately back to baseline. She has difficulty providing details of events or timing, but they occur randomly and have been increasing in frequency. +family h/o sz in her niece. 24 hour ambulatory EEG 9/21/22 was normal. MRI brain w/wo 9/2/22 was unremarkable. She is not on any ASDs. She is admitted to the EMU for continuous video EEG monitoring in order to capture a spell and make a definitive diagnosis. She remained stable throughout her stay. Despite sleep deprivation on nights 1 and 3, she did not have any spells. Interictal EEG was normal. For full details of EEG, see separate EMU reports. After discussion with the patient, will start an empiric and diagnostic trial of Keppra 500mg bid, side effects reviewed. She is having spells more than once a month, so should be able to see if Keppra makes a difference over the next 6 months while she is not driving. She understands that per [facility] regulations, she cannot drive until spell free x 6 months and should avoid other activities that would put her or others in danger if she were to have a seizure. I also recommended she talk to her cardiologist about a 30 day event monitor to fully exclude an arrhythmia.      Patient Active Problem List    Diagnosis Date Noted    Spell of altered consciousness 01/23/2023     Past Medical History:   Diagnosis Date    Basal cell carcinoma (BCC) in situ of skin     High cholesterol     Hypertension       Family History   Problem Relation Age of Onset Heart Disease Mother     Throat cancer Father     Emphysema Father     Breast Cancer Sister     Colon Cancer Sister     Cancer Sister         tonsils    Cancer Sister         tonsils    Hypertension Brother     SKIN CANCER Brother       Social History     Tobacco Use    Smoking status: Never    Smokeless tobacco: Never   Substance Use Topics    Alcohol use: Never     Past Surgical History:   Procedure Laterality Date    HX COLONOSCOPY  2021      Prior to Admission medications    Medication Sig Start Date End Date Taking? Authorizing Provider   levETIRAcetam (KEPPRA) 500 mg tablet Take 1 Tablet by mouth two (2) times a day. 1/26/23  Yes Estephania Ruano MD   atorvastatin (LIPITOR) 10 mg tablet  7/27/22   Provider, Historical   biotin (VITAMIN B7) 5 mg capsule Take  by mouth. Provider, Historical   cholecalciferol (VITAMIN D3) (2,000 UNITS /50 MCG) cap capsule Take  by mouth daily. Provider, Historical     No Known Allergies     Discharge Exam:  Exam:  Visit Vitals  BP (!) 142/80 (BP 1 Location: Right upper arm, BP Patient Position: At rest;Lying)   Pulse 73   Temp 98.3 °F (36.8 °C)   Resp 16   Wt 171 lb (77.6 kg)   SpO2 97%   BMI 27.60 kg/m²     Gen: WNWD in NAD  CV: RRR  Lungs: non labored breathing  Neuro: A&O x 3, no dysarthria or aphasia  CN II-XII: EOMI, face symmetric, no ptosis    Patient Instructions:   Current Discharge Medication List        START taking these medications    Details   levETIRAcetam (KEPPRA) 500 mg tablet Take 1 Tablet by mouth two (2) times a day. Qty: 180 Tablet, Refills: 1           CONTINUE these medications which have NOT CHANGED    Details   atorvastatin (LIPITOR) 10 mg tablet       biotin (VITAMIN B7) 5 mg capsule Take  by mouth. cholecalciferol (VITAMIN D3) (2,000 UNITS /50 MCG) cap capsule Take  by mouth daily. Activity: Activity as tolerated, no driving x 6 months spell free per [facility] regulations  Diet: Regular Diet    Follow-up with Dr. Carson Callow    Signed:   Neli Garcia Estela Whitfield MD  1/26/2023  10:18 AM

## 2023-01-26 NOTE — PROGRESS NOTES
Bedside and Verbal shift change report given to [address] (oncoming nurse) by Rae Donato and Konstantin Delong RN (offgoing nurse). Report included the following information SBAR, Kardex, Cardiac Rhythm NSR, and Dual Neuro Assessment.

## 2023-01-26 NOTE — PROGRESS NOTES
Problem: Discharge Planning  Goal: *Discharge to safe environment  Outcome: Progressing Towards Goal  Goal: *Knowledge of medication management  Outcome: Progressing Towards Goal  Goal: *Knowledge of discharge instructions  Outcome: Progressing Towards Goal     Problem: Patient Education: Go to Patient Education Activity  Goal: Patient/Family Education  Outcome: Progressing Towards Goal     Problem: Pain  Goal: *Control of Pain  Outcome: Progressing Towards Goal     Problem: Patient Education: Go to Patient Education Activity  Goal: Patient/Family Education  Outcome: Progressing Towards Goal     Problem: Seizure Disorder (Adult)  Goal: *STG: Remains free of seizure activity  Outcome: Progressing Towards Goal  Goal: *STG: Maintains lab values within therapeutic range  Outcome: Progressing Towards Goal  Goal: *STG/LTG: Complies with medication therapy  Outcome: Progressing Towards Goal  Goal: *STG: Remains free of injury during seizure activity  Outcome: Progressing Towards Goal  Goal: *STG: Remains safe in hospital  Outcome: Progressing Towards Goal  Goal: Interventions  Outcome: Progressing Towards Goal     Problem: Patient Education: Go to Patient Education Activity  Goal: Patient/Family Education  Outcome: Progressing Towards Goal     Problem: Falls - Risk of  Goal: *Absence of Falls  Description: Document Huseyin Fall Risk and appropriate interventions in the flowsheet.   Outcome: Progressing Towards Goal  Note: Fall Risk Interventions:                                Problem: Patient Education: Go to Patient Education Activity  Goal: Patient/Family Education  Outcome: Progressing Towards Goal

## 2023-01-26 NOTE — DISCHARGE INSTRUCTIONS
Please call your cardiologist about setting up a 30 day cardiac event monitor. No driving per [facility] regulations until spell free x 6 months. During this time, avoid other activities that may put you or others in danger if you were to have a spell. For example, avoid swimming.

## 2023-01-30 NOTE — PROCEDURES
[address]    EMU REPORT    PROCEDURE:  CONTINUOUS VIDEO EEG MONITORING  NAME:   Brandee Martinez  :   1945  ACCOUNT NUMBER : [de-identified]  MRN:   [id number]  DATE OF SERVICE: 23       HISTORY OF PRESENT ILLNESS:  Briefly, patient is a 76-year-old right-handed female with spells of loss of awareness beginning in 2016 and increasing in frequency occurring more than 1 time per month. Work-up has included a 24-hour ambulatory EEG and MRI of the brain both were normal.  Patient is admitted to the EMU in an effort to capture one of her typical events and make a definitive diagnosis. MEDICATIONS:   Lipitor 10mg daily  Vitamin B7  Vitamin D     CONDITIONS:  This represents day 4 of EMU monitoring with continuous video EEG for the purposes of spell detection, characterization, and localization. This study was performed in accordance with the International 10-20 system with one channel devoted to limited EKG. No activating procedures were performed. She was sleep deprived on night 3. This study was done during states of wakefulness and sleep. This study began on 23 at 0601 and ended on 23 at 1010. I have reviewed this record in its entirety. DESCRIPTION:  Upon maximal arousal, the posterior dominant rhythm has a frequency of 9Hz with an amplitude of 20uV. This activity is symmetric in the bilateral posterior derivations and attenuated with eye opening. Normal sleep architecture is seen with stage II sleep recognized by the presence of symmetric vertex waves and sleep spindles. There were no epileptiform discharges, focal abnormalities, or electrographic seizures seen. EVENT ANALYSIS: There were no push button events on day 4     INTERPRETATION:  This a normal continuous video EEG. Pt will be discharged today. SUMMARY:  Pt was monitored for 4 days with sleep deprivation on nights 1 and 3. Unfortunately, no spells were captured.  Interictal EEG was normal.  Recommend an empiric and diagnostic trial of an ASDs. For full details of discharge, see separate discharge summary.             Guillaume Arroyo MD

## 2023-01-30 NOTE — PROCEDURES
[address] [facility]   EMU REPORT    PROCEDURE:  CONTINUOUS VIDEO EEG MONITORING  NAME:   Amena Benton  :   1945  ACCOUNT NUMBER : [de-identified]  MRN:   [id number]  DATE OF SERVICE: 23       HISTORY OF PRESENT ILLNESS:  Briefly, patient is a 29-year-old right-handed female with spells of loss of awareness beginning in 2016 and increasing in frequency occurring more than 1 time per month. Work-up has included a 24-hour ambulatory EEG and MRI of the brain both were normal.  Patient is admitted to the EMU in an effort to capture one of her typical events and make a definitive diagnosis. MEDICATIONS:   Lipitor 10mg daily  Vitamin B7  Vitamin D     CONDITIONS:  This represents day 2 of EMU monitoring with continuous video EEG for the purposes of spell detection, characterization, and localization. This study was performed in accordance with the International 10-20 system with one channel devoted to limited EKG. Photic stimulation was performed as an activating procedure. Patient was sleep deprived on night 1 through dinner on day 2. This study was done during states of wakefulness and sleep. This study began on 23 at [facility] and ended on 23 at [facility]. I have reviewed this record in its entirety. DESCRIPTION:  Upon maximal arousal, the posterior dominant rhythm has a frequency of 10Hz with an amplitude of 20uV. This activity is symmetric in the bilateral posterior derivations and attenuated with eye opening. Photic stimulation did not significantly alter the tracing. Normal sleep architecture is seen with stage II sleep recognized by the presence of symmetric vertex waves and sleep spindles. There were no epileptiform discharges, focal abnormalities, or electrographic seizures seen. EVENT ANALYSIS: There were no push button events on day 2     INTERPRETATION:  This a normal continuous video EEG. Will continue video EEG monitoring in an effort to capture a typical event. Arianna Thayer MD

## 2023-01-30 NOTE — PROCEDURES
[address] [facility]   EMU REPORT    PROCEDURE:  CONTINUOUS VIDEO EEG MONITORING  NAME:   Vila Sever  :   1945  ACCOUNT NUMBER : [de-identified]  MRN:   [id number]  DATE OF SERVICE: 2023       HISTORY OF PRESENT ILLNESS:  Briefly, patient is a 78-year-old right-handed female with spells of loss of awareness beginning in 2016 and increasing in frequency occurring more than 1 time per month. Work-up has included a 24-hour ambulatory EEG and MRI of the brain both were normal.  Patient is admitted to the EMU in an effort to capture one of her typical events and make a definitive diagnosis. MEDICATIONS:   Lipitor 10mg daily  Vit B7  Vit D     CONDITIONS:  This represents day 1 of EMU monitoring with continuous video EEG for the purposes of spell detection, characterization, and localization. This study was performed in accordance with the International 10-20 system with one channel devoted to limited EKG. No activating procedures were performed. Patient was sleep deprived on night 1. This study was done during states of wakefulness and sleep. This study began on 23 at 1108 and ended on 23 at 0601. I have reviewed this record in its entirety. DESCRIPTION:  Upon maximal arousal, the posterior dominant rhythm has a frequency of 10Hz with an amplitude of 20uV. This activity is symmetric in the bilateral posterior derivations and attenuated with eye opening. Normal sleep architecture is seen with stage II sleep recognized by the presence of symmetric vertex waves and sleep spindles. There were no epileptiform discharges, focal abnormalities, or electrographic seizures seen. EVENT ANALYSIS: There were no push button events on day 1     INTERPRETATION:  This a normal continuous video EEG. Will continue video EEG monitoring with sleep deprivation through dinner on day 2.            David Marie MD

## 2023-01-30 NOTE — PROCEDURES
145 [facility]   EMU REPORT    PROCEDURE:  CONTINUOUS VIDEO EEG MONITORING  NAME:   Nelia Sanchez  :   1945  ACCOUNT NUMBER : [de-identified]  MRN:   [id number]  DATE OF SERVICE: 23       HISTORY OF PRESENT ILLNESS:  Briefly, patient is a 43-year-old right-handed female with spells of loss of awareness beginning in 2016 and increasing in frequency occurring more than 1 time per month. Work-up has included a 24-hour ambulatory EEG and MRI of the brain both were normal.  Patient is admitted to the EMU in an effort to capture one of her typical events and make a definitive diagnosis. MEDICATIONS:   Lipitor 10mg daily  Vitamin B7  Vitamin D     CONDITIONS:  This represents day 3 of EMU monitoring with continuous video EEG for the purposes of spell detection, characterization, and localization. This study was performed in accordance with the International 10-20 system with one channel devoted to limited EKG. No activating procedures were performed. She was sleep deprived on night 3. This study was done during states of wakefulness and sleep. This study began on 23 at [facility] and ended on 23 at [facility]. I have reviewed this record in its entirety. DESCRIPTION:  Upon maximal arousal, the posterior dominant rhythm has a frequency of 9Hz with an amplitude of 20uV. This activity is symmetric in the bilateral posterior derivations and attenuated with eye opening. Normal sleep architecture is seen with stage II sleep recognized by the presence of symmetric vertex waves and sleep spindles. There were no epileptiform discharges, focal abnormalities, or electrographic seizures seen. EVENT ANALYSIS: There were no push button events on day 3     INTERPRETATION:  This a normal continuous video EEG. Will continue video EEG monitoring on day 4 until discharge.             Jennie Dejesus MD

## 2023-03-17 ENCOUNTER — TELEPHONE (OUTPATIENT)
Dept: NEUROLOGY | Age: 78
End: 2023-03-17

## 2023-03-17 NOTE — TELEPHONE ENCOUNTER
LM with pt's  that appt was scheduled for pt on 4/6/23 at 26 to discuss EMU stay and continued sz like activity.

## 2023-04-06 ENCOUNTER — OFFICE VISIT (OUTPATIENT)
Dept: NEUROLOGY | Age: 78
End: 2023-04-06

## 2023-04-06 NOTE — LETTER
4/6/2023    Patient: Renée Juarez   YOB: 1945   Date of Visit: 4/6/2023     Luis Hogue, [address] [address]  Via Fax: [phone]    Dear Luis Hogue MD,      Thank you for referring Ms. Renée Juarez to [facility] for evaluation. My notes for this consultation are attached. If you have questions, please do not hesitate to call me. I look forward to following your patient along with you.       Sincerely,    Petra Lamar MD

## 2023-04-06 NOTE — PROGRESS NOTES
Chief Complaint   Patient presents with    Follow-up     Continuous loss of consciousness      Visit Vitals  /64   Pulse 65   Temp 98.7 °F (37.1 °C)   Resp 16   Ht 5' 6\" (1.676 m)   Wt 167 lb 6.4 oz (75.9 kg)   SpO2 95%   BMI 27.02 kg/m²     1. Have you been to the ER, urgent care clinic since your last visit? Hospitalized since your last visit? Yes Reason for visit: LOC    2. Have you seen or consulted any other health care providers outside of the [address] [place] [address] since your last visit? Include any pap smears or colon screening.  No

## 2023-04-06 NOTE — PROGRESS NOTES
[facility] Neurology Clinics and [address] at [facility] Neurology Clinics at [address] [facility], [id number] [place] [address] [address] [facility], [address] [place] [address]   [phone]              Chief Complaint   Patient presents with    Follow-up     Continuous loss of consciousness      Current Outpatient Medications   Medication Sig Dispense Refill    levETIRAcetam (KEPPRA) 500 mg tablet Take 1 Tablet by mouth two (2) times a day. 180 Tablet 1    atorvastatin (LIPITOR) 10 mg tablet       biotin (VITAMIN B7) 5 mg capsule Take  by mouth. cholecalciferol (VITAMIN D3) (2,000 UNITS /50 MCG) cap capsule Take  by mouth daily. No Known Allergies  Social History     Tobacco Use    Smoking status: Never    Smokeless tobacco: Never   Vaping Use    Vaping Use: Never used   Substance Use Topics    Alcohol use: Never    Drug use: Never     74-year-old lady returns today for follow-up. She was last seen by me in August and at that time she was having recurrent episodes of loss of consciousness. She then followed up with nurse practitioner Mikhail in October and she was following with cardiology. She went to the epilepsy monitoring unit where she stayed from January 23 through January 26 of this year and she had a fully normal interictal EEG. No spells were captured. After discussion with the patient Dr. Monica Velazquez started Keppra 500 mg twice daily. It was also recommended that she speak with her cardiologist about a cardiac monitor. No  Laboratory analysis January 23, 2023  Toxicology screen negative  CBC unremarkable  Metabolic panel unremarkable    MRI of the brain from September 2, 2022 with age-related changes  Carotid Doppler September 21, 2022 unremarkable    She continues to have spells of her most recent being March 23. Examination  There were no vitals taken for this visit.   Visit Vitals  /64   Pulse 65   Temp 98.7 °F (37.1 °C) Resp 16   Ht 5' 6\" (1.676 m)   Wt 75.9 kg (167 lb 6.4 oz)   SpO2 95%   BMI 27.02 kg/m²     Awake alert oriented and conversant. Normal speech and language. Normal cognition. No ataxia. Impression/Plan  Recurrent episodes of loss of consciousness with normal neurologic evaluation  Continue empiric Keppra for now and increase the dose to 750 mg twice daily  We will have her see cardiac electrophysiology for Linq monitor implant    Mauro Buckner MD        This note was created using voice recognition software. Despite editing, there may be syntax errors.

## 2023-04-26 PROBLEM — R55 SYNCOPE: Status: ACTIVE | Noted: 2023-04-26
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
## 2023-04-27 ENCOUNTER — OFFICE VISIT (OUTPATIENT)
Dept: CARDIOLOGY CLINIC | Age: 78
End: 2023-04-27

## 2023-04-27 VITALS
OXYGEN SATURATION: 97 % | BODY MASS INDEX: 26.48 KG/M2 | WEIGHT: 164.8 LBS | HEART RATE: 64 BPM | SYSTOLIC BLOOD PRESSURE: 118 MMHG | DIASTOLIC BLOOD PRESSURE: 78 MMHG | RESPIRATION RATE: 16 BRPM | HEIGHT: 66 IN

## 2023-04-27 DIAGNOSIS — G40.909 SEIZURE DISORDER (HCC): ICD-10-CM

## 2023-04-27 DIAGNOSIS — R40.4 SPELL OF ALTERED CONSCIOUSNESS: ICD-10-CM

## 2023-04-27 DIAGNOSIS — R55 SYNCOPE, UNSPECIFIED SYNCOPE TYPE: Primary | ICD-10-CM

## 2023-04-27 DIAGNOSIS — R55 SYNCOPE, UNSPECIFIED SYNCOPE TYPE: ICD-10-CM

## 2023-04-27 NOTE — PATIENT INSTRUCTIONS
Your implantable loop recorder procedure has been scheduled for 5/16/23 at 930 am, at [facility].    Please report to Admitting Department by 830 am, or 1 hour prior to your scheduled procedure. Please bring a list of your current medications and medication bottles, if able, to the hospital on this day. You will need labs drawn prior to your procedure. Please go to have this done today. You should NOT stop your medications prior to your scheduled procedure. A remote check of your ILR will be scheduled for two weeks after implant. Please contact us if your incision shows any signs of infection, such as redness, pain, warmth, or drainage.

## 2023-04-28 ENCOUNTER — TELEPHONE (OUTPATIENT)
Dept: CARDIOLOGY CLINIC | Age: 78
End: 2023-04-28

## 2023-04-28 LAB
ANION GAP SERPL CALC-SCNC: 3 MMOL/L (ref 5–15)
BASOPHILS # BLD: 0 K/UL (ref 0–0.1)
BASOPHILS NFR BLD: 1 % (ref 0–1)
BUN SERPL-MCNC: 18 MG/DL (ref 6–20)
BUN/CREAT SERPL: 24 (ref 12–20)
CALCIUM SERPL-MCNC: 9.4 MG/DL (ref 8.5–10.1)
CHLORIDE SERPL-SCNC: 110 MMOL/L (ref 97–108)
CO2 SERPL-SCNC: 27 MMOL/L (ref 21–32)
CREAT SERPL-MCNC: 0.75 MG/DL (ref 0.55–1.02)
DIFFERENTIAL METHOD BLD: NORMAL
EOSINOPHIL # BLD: 0.2 K/UL (ref 0–0.4)
EOSINOPHIL NFR BLD: 3 % (ref 0–7)
ERYTHROCYTE [DISTWIDTH] IN BLOOD BY AUTOMATED COUNT: 12.2 % (ref 11.5–14.5)
GLUCOSE SERPL-MCNC: 93 MG/DL (ref 65–100)
HCT VFR BLD AUTO: 43.5 % (ref 35–47)
HGB BLD-MCNC: 14.4 G/DL (ref 11.5–16)
IMM GRANULOCYTES # BLD AUTO: 0 K/UL (ref 0–0.04)
IMM GRANULOCYTES NFR BLD AUTO: 0 % (ref 0–0.5)
LYMPHOCYTES # BLD: 1.4 K/UL (ref 0.8–3.5)
LYMPHOCYTES NFR BLD: 26 % (ref 12–49)
MCH RBC QN AUTO: 31.9 PG (ref 26–34)
MCHC RBC AUTO-ENTMCNC: 33.1 G/DL (ref 30–36.5)
MCV RBC AUTO: 96.5 FL (ref 80–99)
MONOCYTES # BLD: 0.6 K/UL (ref 0–1)
MONOCYTES NFR BLD: 10 % (ref 5–13)
NEUTS SEG # BLD: 3.3 K/UL (ref 1.8–8)
NEUTS SEG NFR BLD: 60 % (ref 32–75)
NRBC # BLD: 0 K/UL (ref 0–0.01)
NRBC BLD-RTO: 0 PER 100 WBC
PLATELET # BLD AUTO: 212 K/UL (ref 150–400)
PMV BLD AUTO: 11.4 FL (ref 8.9–12.9)
POTASSIUM SERPL-SCNC: 4.2 MMOL/L (ref 3.5–5.1)
RBC # BLD AUTO: 4.51 M/UL (ref 3.8–5.2)
SODIUM SERPL-SCNC: 140 MMOL/L (ref 136–145)
WBC # BLD AUTO: 5.5 K/UL (ref 3.6–11)

## 2023-04-28 NOTE — TELEPHONE ENCOUNTER
Pt found out that she has a Hernia in her chest, pt would like to know would this affect pt Loop Monitor procedure scheduled with Dr. Praveen Benoit? Pt would like for you call her asap.      Dr. Tana Aden #   [phone]     Pt # [phone]

## 2023-04-28 NOTE — TELEPHONE ENCOUNTER
Returned call, ID verified using two patient identifiers. Patient states she has a hernia in her chest,she's not sure if it's a hiatal hernia or not. Advised her that ILR is implanted just under the skin of the chest to the left of the breastbone and that her hernia is internal and that it will not interfere with having the ILR implanted. Patient verbalizes understanding of all information.

## 2023-05-10 ENCOUNTER — PREP FOR PROCEDURE (OUTPATIENT)
Age: 78
End: 2023-05-10

## 2023-05-15 ENCOUNTER — TELEPHONE (OUTPATIENT)
Age: 78
End: 2023-05-15

## 2023-05-15 NOTE — TELEPHONE ENCOUNTER
Pt called and stated she had an growth under eye and stated they told her it was cancer and would like to speak to nurse,please advise    Pt # [phone]

## 2023-05-16 ENCOUNTER — HOSPITAL ENCOUNTER (OUTPATIENT)
Facility: HOSPITAL | Age: 78
Discharge: HOME | End: 2023-05-18
Payer: MEDICARE

## 2023-05-16 VITALS
HEIGHT: 66 IN | DIASTOLIC BLOOD PRESSURE: 56 MMHG | BODY MASS INDEX: 25.71 KG/M2 | WEIGHT: 160 LBS | HEART RATE: 61 BPM | TEMPERATURE: 97.8 F | RESPIRATION RATE: 20 BRPM | OXYGEN SATURATION: 98 % | SYSTOLIC BLOOD PRESSURE: 105 MMHG

## 2023-05-16 DIAGNOSIS — R55 SYNCOPE AND COLLAPSE: ICD-10-CM

## 2023-05-16 LAB — ECHO BSA: 1.83 M2

## 2023-05-16 PROCEDURE — 33285 INSJ SUBQ CAR RHYTHM MNTR: CPT

## 2023-05-16 PROCEDURE — 2500000003 HC RX 250 WO HCPCS: Performed by: INTERNAL MEDICINE

## 2023-05-16 RX ORDER — LEVETIRACETAM 500 MG/1
500 TABLET ORAL 2 TIMES DAILY
COMMUNITY

## 2023-05-16 RX ORDER — DIPHENHYDRAMINE HYDROCHLORIDE 25 MG/1
1 TABLET ORAL DAILY
COMMUNITY

## 2023-05-16 RX ORDER — ATORVASTATIN CALCIUM 10 MG/1
10 TABLET, FILM COATED ORAL DAILY
COMMUNITY

## 2023-05-16 RX ADMIN — LIDOCAINE HYDROCHLORIDE 10 ML: 10; .005 INJECTION, SOLUTION EPIDURAL; INFILTRATION; INTRACAUDAL; PERINEURAL at 10:21

## 2023-05-16 ASSESSMENT — PAIN SCALES - GENERAL: PAINLEVEL_OUTOF10: 0

## 2023-05-16 NOTE — PROCEDURES
Loop Implant    Procedure Date: 05/16/23  Lab Physician: Adele Urias MD    INDICATIONS:  Cryptogenic Stroke    PROCEDURE NARRATIVE  After obtaining informed consent, the central chest was prepped and draped in sterile fashion creating a sterile site just lateral to the left side of the sternum at about the 5th intercostal space. This area was infiltrated with lidocaine with epinephrine for local anesthesia. A 1 cm puncture was made with the provided puncture tool and a track was created with the Reveal insertion tool. The Reveal Linq was deployed subcutaneously and the insertion tool was removed. The skin closed with a single layer of 4-0 Vicryl suture and dermabond was applied on the incision. Gauze and a sterile bio-occlusive dressing was applied over the incision. The procedure was well tolerated and there were no immediate complications.     Device: [employer] [facility], [id number]  Serial #: [id number]    CONCLUSIONS  Successful ILR insertion

## 2023-05-16 NOTE — PROGRESS NOTES
Pt arrives ambulatory to noninvasive cardiology department accompanied by  for Implantable loop recorder placement procedure. All assessments completed and consent was reviewed. Education given was regarding procedure, Lidocaine medications to be given, potential side effects of medications to be given, post-procedure management and follow-up. Opportunity for questions was provided and all questions and concerns were addressed. Patient and patient contact verbalized understanding of education.

## 2023-05-16 NOTE — PROGRESS NOTES
Patient has returned to baseline at arrival for procedure. Discussed AVS and discharge instructions with patient and provided a copy take home. Pt voiced understanding and denied any questions or need for clarification. Transported with:   This RN via ambulatory to vehicle driven by    Given "[employer] charging unit to take home
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
## 2023-05-16 NOTE — TELEPHONE ENCOUNTER
Patient at [facility] for ILR procedure today. Discussed with Olga Rosario NP. No need to return patient call.

## 2023-05-16 NOTE — DISCHARGE INSTRUCTIONS
Loop Recorder (Linq) Discharge Instructions      Please make sure you have received your Temporary Loop Recorder identification card with your discharge instructions      MEDICATIONS        Take only the medications prescribed to you at discharge. ACTIVITY        Return to your normal activity, except as noted below. Avoid tight clothes or unnecessary pressure over your incision (such as bra straps or seat belts). If it is tender or sensitive to clothing, cover the incision with a soft dressing or pad. Questions about driving are individualized and should be discussed with one of the EP Physicians prior to discharge. SHOWERING        Leave the bandage over your after the Loop Recorder implant. You bandage will be removed in clinic in 7-14 days at your follow up appointment. It is important to keep the bandaged area clean and dry. You may shower around the site until the bandage is removed in clinic. Thereafter, you may shower after the bandage is removed, washing it gently with soap and water. Do not apply any lotions, powders, or perfumes to the incision line. Avoid submerging your incision in water (tub baths, hot tubs, or swimming) for two weeks. DISCHARGE PRECAUTIONS        Record your temperature every day, at the same time, until your follow up. A temperature of 100.5 F, or higher, can be the first sign of infection. This should be reported to your Doctor immediately. Always tell your doctor or dentist that you have a Loop Recorder. In some cases, antibiotics may be prescribed before certain procedures. Your temporary identification will be given to you with these instructions. Keep your device card in your wallet or on your person at all times. You should receive your permanent card, although this may take up to 8-12 weeks.   If you do not receive your permanent card, please call the office at [phone] or the phone number provided on your temporary card for the

## 2023-05-21 RX ORDER — DIPHENHYDRAMINE HYDROCHLORIDE 25 MG/1
TABLET ORAL
COMMUNITY
End: 2023-06-20 | Stop reason: SDUPTHER

## 2023-05-21 RX ORDER — LEVETIRACETAM 500 MG/1
TABLET ORAL
COMMUNITY
Start: 2023-04-06 | End: 2023-06-20 | Stop reason: DRUGHIGH

## 2023-05-21 RX ORDER — ATORVASTATIN CALCIUM 10 MG/1
TABLET, FILM COATED ORAL
COMMUNITY
Start: 2022-07-27 | End: 2023-06-20 | Stop reason: SDUPTHER

## 2023-05-22 ENCOUNTER — TELEPHONE (OUTPATIENT)
Age: 78
End: 2023-05-22

## 2023-05-22 NOTE — TELEPHONE ENCOUNTER
Pt called and would like to speak to nurse stated over the weekend she think she lost conscious but not for long,would like to speak to someone    Pt # [phone]

## 2023-05-22 NOTE — TELEPHONE ENCOUNTER
Returned patient call, ID verified using two patient identifiers. Patient is calling because she thinks she had a moment over the weekend where she lost consciousness. She was at her grandson's tennis match and was standing and she felt like she went out for a minute. She states it felt like she closed her eyes and opened them but lost the time in between. Advised patient that I had the device clinic pull up her ILR transmissions and there have been no events noted over the weekend. Patient denies any other symptoms such as dizziness, visual changes, shortness of breath or fatigue. She is staying hydrated. Suggested she reach out to her neurologist and notify them of her episodes as well. Patient verbalized understanding and will call back with any other questions or concerns.     Future Appointments   Date Time Provider Roxane Rodriguez   6/16/2023  8:15 AM [place] [address] [facility] PRAKASH BS AMB   7/6/2023  1:15 PM Rae Garcia MD [id number] BS AMB   5/8/2024 10:00 AM Adele Jules MD CAVS BS AMB

## 2023-05-23 ENCOUNTER — TELEPHONE (OUTPATIENT)
Age: 78
End: 2023-05-23

## 2023-05-23 NOTE — TELEPHONE ENCOUNTER
Pt stated on 5/21/23 she experienced loss of consciousness without injury. Requesting call to discuss.  Please contact

## 2023-05-24 RX ORDER — LEVETIRACETAM 1000 MG/1
1000 TABLET ORAL 2 TIMES DAILY
Qty: 60 TABLET | Refills: 3 | Status: SHIPPED | OUTPATIENT
Start: 2023-05-24

## 2023-05-24 NOTE — TELEPHONE ENCOUNTER
Pt notified of Dr. Michelle Gee. Thomas 1000 mg sent to [facility]. VV with [facility] set up for 6/20/23.

## 2023-06-20 ENCOUNTER — TELEMEDICINE (OUTPATIENT)
Age: 78
End: 2023-06-20
Payer: MEDICARE

## 2023-06-20 DIAGNOSIS — G40.909 SEIZURE DISORDER (HCC): ICD-10-CM

## 2023-06-20 DIAGNOSIS — R55 SYNCOPE AND COLLAPSE: Primary | ICD-10-CM

## 2023-06-20 PROCEDURE — G8399 PT W/DXA RESULTS DOCUMENT: HCPCS | Performed by: NURSE PRACTITIONER

## 2023-06-20 PROCEDURE — 1090F PRES/ABSN URINE INCON ASSESS: CPT | Performed by: NURSE PRACTITIONER

## 2023-06-20 PROCEDURE — 1123F ACP DISCUSS/DSCN MKR DOCD: CPT | Performed by: NURSE PRACTITIONER

## 2023-06-20 PROCEDURE — 99214 OFFICE O/P EST MOD 30 MIN: CPT | Performed by: NURSE PRACTITIONER

## 2023-06-20 PROCEDURE — G8427 DOCREV CUR MEDS BY ELIG CLIN: HCPCS | Performed by: NURSE PRACTITIONER

## 2023-06-20 NOTE — PROGRESS NOTES
[address] [facility],[address]  [address]   [place] [address] [facility] N[facility]   [phone] Office   [phone] Fax           Date:  23     Name:  Nick Mederos  :    MRN:  [id number]     PCP:  Deepak Barrios MD    Meron Reid is a 77 y.o. female who was seen by synchronous (real-time) audio-video technology on 2023 for Neurologic Problem (Unexplained LOC)    Subjective:   Since her last office visit, she has the cardiac monitor implant. This was implanted May 16. She had an episode on May 19 without any evidence for cause on the LOOP. With this episode, she was standing up. Prior to January, she states that she might have had these episodes as much as once a month. Since January after starting the [address], this did not occur again until March. The Keppra was increased to 750mg twice a day. When she had the next episode in May, it was increased again to 1000mg twice a day. Recently, she started having more issues with loss of appetite and loss of hair. This is something that this is something she has noticed since January. Her daughter indicates that this might have started prior to this. Recap from LOV:  Recurrent episodes of loss of consciousness with normal neurologic evaluation  Continue empiric Keppra for now and increase the dose to 750 mg twice daily  We will have her see cardiac electrophysiology for Linq monitor implant    She went to the epilepsy monitoring unit where she stayed from  through  of this year and she had a fully normal interictal EEG. No spells were captured.     Laboratory analysis 2023  Toxicology screen negative  CBC unremarkable  Metabolic panel unremarkable    MRI of the brain from 2022 with age-related changes  Carotid Doppler 2022 unremarkable  ANS testing-cardiac portion was normal. Q-sweat was abnormal and can be seen

## 2023-06-23 ENCOUNTER — NURSE ONLY (OUTPATIENT)
Age: 78
End: 2023-06-23

## 2023-06-23 DIAGNOSIS — Z95.818 IMPLANTABLE LOOP RECORDER PRESENT: Primary | ICD-10-CM

## 2023-06-27 ENCOUNTER — TELEPHONE (OUTPATIENT)
Age: 78
End: 2023-06-27

## 2023-06-27 NOTE — TELEPHONE ENCOUNTER
Patient would like a call from [place] regarding side effects possibly from medication.     Please contact

## 2023-06-30 ENCOUNTER — TELEPHONE (OUTPATIENT)
Age: 78
End: 2023-06-30

## 2023-07-03 NOTE — TELEPHONE ENCOUNTER
Returned her call. She verbalized understanding. She would contact her dermatologist to see if they were ok with prescribing that medication again. She would get back to us if needed.

## 2023-07-28 PROCEDURE — 93298 REM INTERROG DEV EVAL SCRMS: CPT | Performed by: INTERNAL MEDICINE

## 2023-07-28 PROCEDURE — G2066 INTER DEVC REMOTE 30D: HCPCS | Performed by: INTERNAL MEDICINE

## 2023-08-25 PROCEDURE — G2066 INTER DEVC REMOTE 30D: HCPCS | Performed by: INTERNAL MEDICINE

## 2023-08-25 PROCEDURE — 93298 REM INTERROG DEV EVAL SCRMS: CPT | Performed by: INTERNAL MEDICINE

## 2023-09-26 PROCEDURE — 93298 REM INTERROG DEV EVAL SCRMS: CPT | Performed by: INTERNAL MEDICINE

## 2023-09-26 PROCEDURE — G2066 INTER DEVC REMOTE 30D: HCPCS | Performed by: INTERNAL MEDICINE

## 2023-10-05 ENCOUNTER — OFFICE VISIT (OUTPATIENT)
Age: 78
End: 2023-10-05
Payer: MEDICARE

## 2023-10-05 VITALS — HEART RATE: 75 BPM | DIASTOLIC BLOOD PRESSURE: 74 MMHG | SYSTOLIC BLOOD PRESSURE: 120 MMHG

## 2023-10-05 DIAGNOSIS — G40.909 SEIZURE DISORDER (HCC): Primary | ICD-10-CM

## 2023-10-05 PROCEDURE — 1090F PRES/ABSN URINE INCON ASSESS: CPT | Performed by: PSYCHIATRY & NEUROLOGY

## 2023-10-05 PROCEDURE — G8484 FLU IMMUNIZE NO ADMIN: HCPCS | Performed by: PSYCHIATRY & NEUROLOGY

## 2023-10-05 PROCEDURE — G8399 PT W/DXA RESULTS DOCUMENT: HCPCS | Performed by: PSYCHIATRY & NEUROLOGY

## 2023-10-05 PROCEDURE — G8428 CUR MEDS NOT DOCUMENT: HCPCS | Performed by: PSYCHIATRY & NEUROLOGY

## 2023-10-05 PROCEDURE — G8419 CALC BMI OUT NRM PARAM NOF/U: HCPCS | Performed by: PSYCHIATRY & NEUROLOGY

## 2023-10-05 PROCEDURE — 4004F PT TOBACCO SCREEN RCVD TLK: CPT | Performed by: PSYCHIATRY & NEUROLOGY

## 2023-10-05 PROCEDURE — 1123F ACP DISCUSS/DSCN MKR DOCD: CPT | Performed by: PSYCHIATRY & NEUROLOGY

## 2023-10-05 PROCEDURE — 99214 OFFICE O/P EST MOD 30 MIN: CPT | Performed by: PSYCHIATRY & NEUROLOGY

## 2023-10-05 RX ORDER — LEVETIRACETAM 1000 MG/1
1000 TABLET ORAL 2 TIMES DAILY
Qty: 180 TABLET | Refills: 3 | Status: SHIPPED | OUTPATIENT
Start: 2023-10-05

## 2023-10-05 RX ORDER — LEVETIRACETAM 1000 MG/1
1000 TABLET ORAL 2 TIMES DAILY
Qty: 180 TABLET | Refills: 3 | Status: SHIPPED | OUTPATIENT
Start: 2023-10-05 | End: 2023-10-05 | Stop reason: SDUPTHER

## 2023-10-05 NOTE — PROGRESS NOTES
[facility] Neurology Clinics and [address] [facility] Daya Alan at [facility] Neurology Clinics at [address], [id number] [place] [address]   [phone]              Chief Complaint   Patient presents with    Follow-up     Current Outpatient Medications   Medication Sig Dispense Refill    levETIRAcetam (KEPPRA) 1000 MG tablet Take 1 tablet by mouth 2 times daily 60 tablet 3    Cholecalciferol 50 MCG (2000 UT) CAPS Take by mouth daily      atorvastatin (LIPITOR) 10 MG tablet Take 1 tablet by mouth daily      Biotin 5 MG CAPS Take 1 capsule by mouth Daily      Brivaracetam (BRIVIACT) 100 MG TABS tablet Take 1 tablet by mouth 2 times daily for 90 days. Max Daily Amount: 200 mg 60 tablet 2     No current facility-administered medications for this visit. No Known Allergies  Social History     Tobacco Use    Smoking status: Never    Smokeless tobacco: Never   Substance Use Topics    Alcohol use: Never    Drug use: Never     39-year-old lady returns today for follow-up. She last saw nurse practitioner Marilyn Nelson in June and at that time she was being seen for unexplained loss of consciousness. She has a loop monitor in place. She had an episode May 19 with no cardiogenic cause on the loop. She was said to been standing up. She was on Keppra 750 mg twice daily. After that episode her Keppra was increased to a dose of the 1000 mg twice daily. She was changed to briviact secondary to side effects of Keppra. Telephone note from [address] [facility] where patient wanted to stay on [address] [facility] so she was put back on Keppra and not briviact. MRI of the brain September 2022 age-related changes  Autonomic nervous system testing unremarkable  EEG unremarkable    Today she reports no further episodes. Her last was May 21. She will be 6 months without incident November 22. We discussed that she would officially be able to drive then.   She

## 2023-10-05 NOTE — PROGRESS NOTES
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

## 2023-11-01 PROCEDURE — G2066 INTER DEVC REMOTE 30D: HCPCS | Performed by: INTERNAL MEDICINE

## 2023-11-01 PROCEDURE — 93298 REM INTERROG DEV EVAL SCRMS: CPT | Performed by: INTERNAL MEDICINE

## 2024-01-07 PROCEDURE — 93298 REM INTERROG DEV EVAL SCRMS: CPT | Performed by: INTERNAL MEDICINE

## 2024-01-08 ENCOUNTER — OFFICE VISIT (OUTPATIENT)
Age: 79
End: 2024-01-08
Payer: MEDICARE

## 2024-01-08 VITALS
OXYGEN SATURATION: 97 % | HEART RATE: 71 BPM | WEIGHT: 168 LBS | HEIGHT: 66 IN | BODY MASS INDEX: 27 KG/M2 | DIASTOLIC BLOOD PRESSURE: 67 MMHG | SYSTOLIC BLOOD PRESSURE: 137 MMHG

## 2024-01-08 DIAGNOSIS — R29.818 TRANSIENT NEUROLOGICAL SYMPTOMS: ICD-10-CM

## 2024-01-08 PROCEDURE — 99214 OFFICE O/P EST MOD 30 MIN: CPT | Performed by: PSYCHIATRY & NEUROLOGY

## 2024-01-08 PROCEDURE — G8428 CUR MEDS NOT DOCUMENT: HCPCS | Performed by: PSYCHIATRY & NEUROLOGY

## 2024-01-08 PROCEDURE — G8484 FLU IMMUNIZE NO ADMIN: HCPCS | Performed by: PSYCHIATRY & NEUROLOGY

## 2024-01-08 PROCEDURE — 1036F TOBACCO NON-USER: CPT | Performed by: PSYCHIATRY & NEUROLOGY

## 2024-01-08 PROCEDURE — G8419 CALC BMI OUT NRM PARAM NOF/U: HCPCS | Performed by: PSYCHIATRY & NEUROLOGY

## 2024-01-08 PROCEDURE — 1090F PRES/ABSN URINE INCON ASSESS: CPT | Performed by: PSYCHIATRY & NEUROLOGY

## 2024-01-08 PROCEDURE — G8399 PT W/DXA RESULTS DOCUMENT: HCPCS | Performed by: PSYCHIATRY & NEUROLOGY

## 2024-01-08 PROCEDURE — 1123F ACP DISCUSS/DSCN MKR DOCD: CPT | Performed by: PSYCHIATRY & NEUROLOGY

## 2024-01-08 RX ORDER — LEVETIRACETAM 1000 MG/1
1000 TABLET ORAL 2 TIMES DAILY
Qty: 180 TABLET | Refills: 3 | Status: SHIPPED | OUTPATIENT
Start: 2024-01-08

## 2024-01-08 NOTE — PROGRESS NOTES
Identified pt with two pt identifiers(name and ). Reviewed record in preparation for visit and have obtained necessary documentation. All patient medications has been reviewed.  Chief Complaint   Patient presents with    3 Month Follow-Up    Seizures     Pt has had 1 seizure since her last visit the week after Thanksgiving.    Medication Refill       Vitals:    24 0806   BP: 137/67   Pulse: 71   SpO2: 97%                   Coordination of Care Questionnaire:   1) Have you been to an emergency room, urgent care, or hospitalized since your last visit?   no    2. Have seen or consulted any other health care provider since your last visit? no

## 2024-01-08 NOTE — PROGRESS NOTES
[facility] Neurology Clinics and Neurodiagnostic Center at [facility] Neurology Clinics at [address] [place] [address] [address] [facility] [address] [place] [address] [phone]              Chief Complaint   Patient presents with    3 Month Follow-Up    Seizures     Pt has had 1 seizure since her last visit the week after Thanksgiving.     Current Outpatient Medications   Medication Sig Dispense Refill    levETIRAcetam (KEPPRA) 1000 MG tablet Take 1 tablet by mouth 2 times daily 180 tablet 3    Cholecalciferol 50 MCG (2000 UT) CAPS Take by mouth daily      atorvastatin (LIPITOR) 10 MG tablet Take 1 tablet by mouth daily      Biotin 5 MG CAPS Take 1 capsule by mouth Daily       No current facility-administered medications for this visit.      No Known Allergies  Social History     Tobacco Use    Smoking status: Never    Smokeless tobacco: Never   Vaping Use    Vaping Use: Never used   Substance Use Topics    Alcohol use: Never    Drug use: Never     78-year-old following up today after last being seen in October.  She has recurrent spells of unknown etiology and has been treated empirically with Keppra.  No cardiac source and she does have ILR in place.  She had had no further episodes in October.  Her last was May 21, 2023.    Today she reports having an episode in November.  She laid down to go to bed and she opened her eyes and also is always white.  She then closed her eyes with the sleep.  Tolerating Keppra    Workup has included:  MRI of the brain September 2022 age-related changes  Autonomic nervous system testing unremarkable  EEG unremarkable  Carotid Doppler September 21, 2022 unremarkable  Epilepsy monitoring unit where she stayed from January 23 through January 26, 2023 with fully normal interictal EEG.  No spells were captured.       Examination  Ht 1.67 m (5' 5.75\")   Wt 76.2 kg (168 lb)   BMI 27.32 kg/m²   Pleasant lady.  She is

## 2024-02-20 ENCOUNTER — TELEPHONE (OUTPATIENT)
Age: 79
End: 2024-02-20

## 2024-02-20 NOTE — TELEPHONE ENCOUNTER
JUDYM for Pt. To call, cancelled 05/08 appt.     ** Ok to schedule with Dr. Skinner 1st available, or with an NP if they want to be seen sooner, Dorcas Bobo or Winifred Wu**

## 2024-05-05 PROCEDURE — 93298 REM INTERROG DEV EVAL SCRMS: CPT | Performed by: INTERNAL MEDICINE

## 2024-05-20 ENCOUNTER — OFFICE VISIT (OUTPATIENT)
Age: 79
End: 2024-05-20
Payer: MEDICARE

## 2024-05-20 VITALS
BODY MASS INDEX: 25.13 KG/M2 | RESPIRATION RATE: 16 BRPM | HEIGHT: 66 IN | OXYGEN SATURATION: 98 % | DIASTOLIC BLOOD PRESSURE: 68 MMHG | WEIGHT: 156.4 LBS | SYSTOLIC BLOOD PRESSURE: 118 MMHG | HEART RATE: 68 BPM

## 2024-05-20 DIAGNOSIS — R55 SYNCOPE, UNSPECIFIED SYNCOPE TYPE: Primary | ICD-10-CM

## 2024-05-20 DIAGNOSIS — Z95.818 IMPLANTABLE LOOP RECORDER PRESENT: ICD-10-CM

## 2024-05-20 PROCEDURE — 99213 OFFICE O/P EST LOW 20 MIN: CPT | Performed by: NURSE PRACTITIONER

## 2024-05-20 PROCEDURE — 1090F PRES/ABSN URINE INCON ASSESS: CPT | Performed by: NURSE PRACTITIONER

## 2024-05-20 PROCEDURE — G8419 CALC BMI OUT NRM PARAM NOF/U: HCPCS | Performed by: NURSE PRACTITIONER

## 2024-05-20 PROCEDURE — 1036F TOBACCO NON-USER: CPT | Performed by: NURSE PRACTITIONER

## 2024-05-20 PROCEDURE — 1123F ACP DISCUSS/DSCN MKR DOCD: CPT | Performed by: NURSE PRACTITIONER

## 2024-05-20 PROCEDURE — G8399 PT W/DXA RESULTS DOCUMENT: HCPCS | Performed by: NURSE PRACTITIONER

## 2024-05-20 PROCEDURE — G8427 DOCREV CUR MEDS BY ELIG CLIN: HCPCS | Performed by: NURSE PRACTITIONER

## 2024-05-20 NOTE — PROGRESS NOTES
Room #: EP 2    Chief Complaint   Patient presents with    Other     ILR    Loss of Consciousness       /68 (Site: Left Upper Arm, Position: Sitting, Cuff Size: Medium Adult)   Pulse 68   Resp 16   Ht 1.67 m (5' 5.75\")   Wt 70.9 kg (156 lb 6.4 oz)   SpO2 98%   BMI 25.44 kg/m²         Chest pain: NO       1. Have you been to the ER, urgent care clinic outside of [facility] since your last visit?  Hospitalized since your last visit?  NO        Refills:  NO

## 2024-05-20 NOTE — PROGRESS NOTES
Cardiac Electrophysiology OFFICE Follow Up Note             Assessment/Plan:   1. Syncope, unspecified syncope type  2. Implantable loop recorder present    Recurrent syncope, s/p ILR  - Continue remote transmissions   - follow up with Dr. Skinner in one year, or sooner for any concerns    Seizure disorder  - Keppra per neuro           Subjective:         Renee He is a 79 y.o. patient who is seen for follow up of ILR.     She was referred by neurology after having recurrent episodes of loss of consciousness.        Her first 1 began probably around 2016.  She recalls being on the stairs of a garden tub watering some plants.  She started to come down the stairs and then she had everything go black and she lost consciousness. She fractured her foot and hurt her knee. She hit her head. She has had 3 other occurrences and all of them seem to happen when she comes downstairs. She will be coming down the stairs and then everything goes black and then she seems to regain consciousness right before she hits the floor. She says she usually recalls hitting the floor. She did not really think much of it until about in August of 2022 she was parking her car in a parking lot. The next recollection she has is that she awakened in her car had gone up on a curb in the [facility] strip.She gets no warning with these.       MRI negative for cryptogenic stroke.   She went to the epilepsy monitoring unit where she stayed from January 23 through January 26 of this year and she had a fully normal interictal EEG.  No spells were captured.  After discussion with the patient Dr. Randall started Keppra.  She wore 30 day event with no arrhyhtmia or pauses.      Admitted for chest pain at [facility]. Remote hx of cardiac cath (20+ years ago) no PCI. Dx with coronary spasm at that time.      Event monitor per Alexander Hooper: 30 days. NO AF, Avg HR was 63 with PACs. PVCs.      Carotids:  1-15% bilateral ICA     MRI; Mild/minimal chronic microvascular

## 2024-05-28 ENCOUNTER — TELEPHONE (OUTPATIENT)
Age: 79
End: 2024-05-28

## 2024-05-28 NOTE — TELEPHONE ENCOUNTER
If the call be placed after 3 pm please call her mobile.    Requesting a call to discuss the Medtronic Device she has had for over a year.    She would like to discuss removing the device.

## 2024-05-31 ENCOUNTER — TELEPHONE (OUTPATIENT)
Age: 79
End: 2024-05-31

## 2024-05-31 NOTE — TELEPHONE ENCOUNTER
Patient is calling again, would like to speak to nurse regarding the Medtronic Device.    Please contact.

## 2024-05-31 NOTE — TELEPHONE ENCOUNTER
Returned call.  Pt states she had consulted with Dr. Montes's PA as pt feels she is getting no benefit from the DBS.  Was informed that there was no medical reason for pt to keep the DBS implanted if she prefers to have it removed.      Pt wanted to have order from Dr. Benito' office to neurosurgery to have it removed.  Pt aware that Dr. Benito is out of office until 6/25/24 but msg will be forwarded to him to review upon his return.

## 2024-07-08 NOTE — TELEPHONE ENCOUNTER
When I s/w pt 4 weeks ago, she stated she saw Dr. Montes's PA.  Called back as there was confusion with pt having DBS which I thought was what pt meant since she said Dr. Montes who is [facility] d/o specialist.    When asked to clarify, Pt stated (pronunciation) Dr. Mahoney/Simon.  Looked at chart, asked if she was referring to heart, Dr SKINNER.  She said yes.    Notified that it would be up to Dr. Skinner if she wanted to discuss removing ILR as most people just leave it in, even if they are no longer being monitored.

## 2024-07-11 ENCOUNTER — OFFICE VISIT (OUTPATIENT)
Age: 79
End: 2024-07-11
Payer: MEDICARE

## 2024-07-11 VITALS
SYSTOLIC BLOOD PRESSURE: 158 MMHG | BODY MASS INDEX: 25.07 KG/M2 | RESPIRATION RATE: 14 BRPM | HEIGHT: 66 IN | WEIGHT: 156 LBS | OXYGEN SATURATION: 97 % | DIASTOLIC BLOOD PRESSURE: 70 MMHG | HEART RATE: 65 BPM

## 2024-07-11 DIAGNOSIS — R29.818 TRANSIENT NEUROLOGICAL SYMPTOMS: Primary | ICD-10-CM

## 2024-07-11 PROCEDURE — 99214 OFFICE O/P EST MOD 30 MIN: CPT | Performed by: PSYCHIATRY & NEUROLOGY

## 2024-07-11 PROCEDURE — 1036F TOBACCO NON-USER: CPT | Performed by: PSYCHIATRY & NEUROLOGY

## 2024-07-11 PROCEDURE — 1123F ACP DISCUSS/DSCN MKR DOCD: CPT | Performed by: PSYCHIATRY & NEUROLOGY

## 2024-07-11 PROCEDURE — G8419 CALC BMI OUT NRM PARAM NOF/U: HCPCS | Performed by: PSYCHIATRY & NEUROLOGY

## 2024-07-11 PROCEDURE — 1090F PRES/ABSN URINE INCON ASSESS: CPT | Performed by: PSYCHIATRY & NEUROLOGY

## 2024-07-11 PROCEDURE — G8427 DOCREV CUR MEDS BY ELIG CLIN: HCPCS | Performed by: PSYCHIATRY & NEUROLOGY

## 2024-07-11 PROCEDURE — G8399 PT W/DXA RESULTS DOCUMENT: HCPCS | Performed by: PSYCHIATRY & NEUROLOGY

## 2024-07-11 PROCEDURE — 93298 REM INTERROG DEV EVAL SCRMS: CPT | Performed by: INTERNAL MEDICINE

## 2024-07-11 RX ORDER — LEVETIRACETAM 1000 MG/1
1000 TABLET ORAL 2 TIMES DAILY
Qty: 180 TABLET | Refills: 3 | Status: SHIPPED | OUTPATIENT
Start: 2024-07-11

## 2024-07-11 NOTE — PROGRESS NOTES
[facility] Neurology Clinics and Neurodiagnostic Center at [facility] Neurology Clinics at [address] [place] [address] [address] [facility] [address] [place] [address] [phone]              Chief Complaint   Patient presents with    Loss of Consciousness     Follow up // Keppra 1000/1000 // Pt reports since last visit  she has had 3-4 x where she feels \"it was about to start\" but it did not.      Current Outpatient Medications   Medication Sig Dispense Refill    NONFORMULARY Take 2 tablets by mouth daily Visacal ordered online      levETIRAcetam (KEPPRA) 1000 MG tablet Take 1 tablet by mouth 2 times daily 180 tablet 3    Cholecalciferol 50 MCG (2000 UT) CAPS Take by mouth daily      atorvastatin (LIPITOR) 10 MG tablet Take 1 tablet by mouth daily      Biotin 5 MG CAPS Take 1 capsule by mouth Daily       No current facility-administered medications for this visit.      Allergies   Allergen Reactions    Zocor [Simvastatin] Diarrhea     Social History     Tobacco Use    Smoking status: Never    Smokeless tobacco: Never   Vaping Use    Vaping Use: Never used   Substance Use Topics    Alcohol use: Never    Drug use: Never     79-year-old lady returns today for recurrent episodes of what we are calling spells of unknown etiology/seizure-like activity and we have had her on Keppra empirically.  She does not lose consciousness with these.  She does have an ILR in place.  She has not had any further episodes.  She is happy with how she is doing.  Tolerating medications.  She has had a couple of events where for split-second she thought this moment, but it did not.  She is not driving.    Previous diagnostics  MRI of the brain September 2022 age-related changes  Autonomic nervous system testing unremarkable  EEG unremarkable  Carotid Doppler September 21, 2022 unremarkable  Epilepsy monitoring unit where she stayed from January 23 through January 26, 2023

## 2024-10-13 PROCEDURE — 93298 REM INTERROG DEV EVAL SCRMS: CPT | Performed by: INTERNAL MEDICINE

## 2024-10-14 ENCOUNTER — TELEPHONE (OUTPATIENT)
Age: 79
End: 2024-10-14

## 2024-10-14 NOTE — TELEPHONE ENCOUNTER
Patient is calling in regards to she came in to see NP in May, and she is wanting to know can she have the loop recorder turned off since she doesn't need it. She would like a call back ASAP to discuss further.     Patient phone # [phone]

## 2024-10-14 NOTE — TELEPHONE ENCOUNTER
Pt wants ILR removed this year if possible. Please call pt to give her EP NP appt to get H/P for procedure.

## 2024-12-14 PROCEDURE — 93298 REM INTERROG DEV EVAL SCRMS: CPT | Performed by: INTERNAL MEDICINE

## 2025-01-08 NOTE — PROGRESS NOTES
[facility] Cardiology  Cardiac Electrophysiology Clinic Care Note                  []Initial visit     [x]Established visit     Patient Name: Renee He - :1945 - MRN:[id number]  Primary Cardiologist: Alexander Hooper MD  Electrophysiologist: Adele Skinner MD     Reason for visit: ILR follow up    HPI:  Ms. He is a 79 y.o. female who presents for follow up, is s/p Medtronic ILR (DOI 2023) for recurrent syncope.    No events to date noted via ILR.    She denies chest pain, palpitations, SOB, or edema.  No recent recurrent syncope, states there's historically been no predictable frequency.    30 day monitor in  showed no AF, avg HR 63 with PACs & PVCs.    MRI brain in 2022 showed no significant acute abnormalities.    Carotid duplex in 2022 showed mild carotid stenosis.    BP controlled.      Previous:  Medtronic ILR (DOI 2023-Susanne) implanted for syncope.    Underwent monitoring in epilepsy unit in 2023, showed normal interictal EEG.  No spells captured.  Dr. Randall started patient on Keppra.    Syncope while parking her car in 2022.  No warning.    Syncope in , occurred while going down the stairs after watering plants, resulted in fracture of foot & knee injury.  Multiple recurrences over the years, tend to be while descending stairs, has sudden LOC & regains consciousness right before hitting the floor.    Remote history of [facility], diagnosed with coronary spasm.       Assessment and Plan       ICD-10-CM    1. Syncope, unspecified syncope type  R55       2. Implantable loop recorder present  Z95.818           Syncope: Recurrent, occurs without warning (see above).  No events noted via ILR.  Interictal EEG unremarkable in epilepsy monitoring unit, but Dr. Randall started Keppra.    Medtronic ILR (2023): Implanted for recurrent syncope.  No events.  Patient wishes to have ILR explanted.  Reviewed risks/benefits of explant vs allowing it to remain in place.  She

## 2025-01-16 ENCOUNTER — OFFICE VISIT (OUTPATIENT)
Age: 80
End: 2025-01-16
Payer: MEDICARE

## 2025-01-16 VITALS
HEIGHT: 65 IN | HEART RATE: 71 BPM | DIASTOLIC BLOOD PRESSURE: 78 MMHG | SYSTOLIC BLOOD PRESSURE: 142 MMHG | OXYGEN SATURATION: 97 % | BODY MASS INDEX: 26.82 KG/M2 | WEIGHT: 161 LBS

## 2025-01-16 DIAGNOSIS — Z95.818 IMPLANTABLE LOOP RECORDER PRESENT: ICD-10-CM

## 2025-01-16 DIAGNOSIS — R55 SYNCOPE, UNSPECIFIED SYNCOPE TYPE: Primary | ICD-10-CM

## 2025-01-16 PROCEDURE — 1090F PRES/ABSN URINE INCON ASSESS: CPT | Performed by: NURSE PRACTITIONER

## 2025-01-16 PROCEDURE — G8399 PT W/DXA RESULTS DOCUMENT: HCPCS | Performed by: NURSE PRACTITIONER

## 2025-01-16 PROCEDURE — G8427 DOCREV CUR MEDS BY ELIG CLIN: HCPCS | Performed by: NURSE PRACTITIONER

## 2025-01-16 PROCEDURE — 99214 OFFICE O/P EST MOD 30 MIN: CPT | Performed by: NURSE PRACTITIONER

## 2025-01-16 PROCEDURE — 1036F TOBACCO NON-USER: CPT | Performed by: NURSE PRACTITIONER

## 2025-01-16 PROCEDURE — 1123F ACP DISCUSS/DSCN MKR DOCD: CPT | Performed by: NURSE PRACTITIONER

## 2025-01-16 PROCEDURE — 1159F MED LIST DOCD IN RCRD: CPT | Performed by: NURSE PRACTITIONER

## 2025-01-16 PROCEDURE — G8419 CALC BMI OUT NRM PARAM NOF/U: HCPCS | Performed by: NURSE PRACTITIONER

## 2025-01-16 NOTE — PROGRESS NOTES
Chief Complaint   Patient presents with    Dizziness     Vitals:    01/16/25 1012   BP: (!) 142/78   Site: Left Upper Arm   Position: Sitting   Pulse: 71   SpO2: 97%   Weight: 73 kg (161 lb)   Height: 1.651 m (5' 5\")     Chest pain: DENIED     Recent hospital stays: DENIED     Refills: DENIED

## 2025-01-21 ENCOUNTER — TELEPHONE (OUTPATIENT)
Age: 80
End: 2025-01-21

## 2025-01-21 PROBLEM — Z95.818 STATUS POST PLACEMENT OF IMPLANTABLE LOOP RECORDER: Status: ACTIVE | Noted: 2025-01-21

## 2025-01-21 NOTE — TELEPHONE ENCOUNTER
Spoke to Pt schedule ILR on 2/21/25 at 1pm arrive at 12pm. Check in at the 2nd  Floor Outpt Reg. Desk  at [facility]

## 2025-01-30 ENCOUNTER — OFFICE VISIT (OUTPATIENT)
Age: 80
End: 2025-01-30
Payer: MEDICARE

## 2025-01-30 VITALS
BODY MASS INDEX: 25.88 KG/M2 | WEIGHT: 161 LBS | HEIGHT: 66 IN | RESPIRATION RATE: 16 BRPM | HEART RATE: 79 BPM | SYSTOLIC BLOOD PRESSURE: 121 MMHG | OXYGEN SATURATION: 97 % | DIASTOLIC BLOOD PRESSURE: 56 MMHG

## 2025-01-30 DIAGNOSIS — R29.818 TRANSIENT NEUROLOGICAL SYMPTOMS: ICD-10-CM

## 2025-01-30 PROCEDURE — 1123F ACP DISCUSS/DSCN MKR DOCD: CPT | Performed by: PSYCHIATRY & NEUROLOGY

## 2025-01-30 PROCEDURE — G8419 CALC BMI OUT NRM PARAM NOF/U: HCPCS | Performed by: PSYCHIATRY & NEUROLOGY

## 2025-01-30 PROCEDURE — 1159F MED LIST DOCD IN RCRD: CPT | Performed by: PSYCHIATRY & NEUROLOGY

## 2025-01-30 PROCEDURE — G8427 DOCREV CUR MEDS BY ELIG CLIN: HCPCS | Performed by: PSYCHIATRY & NEUROLOGY

## 2025-01-30 PROCEDURE — 99214 OFFICE O/P EST MOD 30 MIN: CPT | Performed by: PSYCHIATRY & NEUROLOGY

## 2025-01-30 PROCEDURE — 1036F TOBACCO NON-USER: CPT | Performed by: PSYCHIATRY & NEUROLOGY

## 2025-01-30 PROCEDURE — 1090F PRES/ABSN URINE INCON ASSESS: CPT | Performed by: PSYCHIATRY & NEUROLOGY

## 2025-01-30 PROCEDURE — G8399 PT W/DXA RESULTS DOCUMENT: HCPCS | Performed by: PSYCHIATRY & NEUROLOGY

## 2025-01-30 PROCEDURE — 1126F AMNT PAIN NOTED NONE PRSNT: CPT | Performed by: PSYCHIATRY & NEUROLOGY

## 2025-01-30 RX ORDER — LEVETIRACETAM 1000 MG/1
1000 TABLET ORAL 2 TIMES DAILY
Qty: 180 TABLET | Refills: 3 | Status: SHIPPED | OUTPATIENT
Start: 2025-01-30

## 2025-01-30 NOTE — PROGRESS NOTES
[facility] Neurology Clinics and Neurodiagnostic Center at [facility] Neurology Clinics at [address] [place] [address] [address] [facility] [address] [place] [address] [phone]              Chief Complaint   Patient presents with    spells of unknown etiology/seizure-like activity     On Keppra// following w/ cards- implantable loop recorder      Current Outpatient Medications   Medication Sig Dispense Refill    levETIRAcetam (KEPPRA) 1000 MG tablet Take 1 tablet by mouth 2 times daily 180 tablet 3    NONFORMULARY Take 2 tablets by mouth daily Visacal ordered online      Cholecalciferol 50 MCG (2000 UT) CAPS Take by mouth daily      Biotin 5 MG CAPS Take 1 capsule by mouth Daily      atorvastatin (LIPITOR) 10 MG tablet Take 1 tablet by mouth daily (Patient not taking: Reported on 1/30/2025)       No current facility-administered medications for this visit.      Allergies   Allergen Reactions    Zocor [Simvastatin] Diarrhea     Social History     Tobacco Use    Smoking status: Never    Smokeless tobacco: Never   Vaping Use    Vaping status: Never Used   Substance Use Topics    Alcohol use: Never    Drug use: Never     History of Present Illness  79-year-old lady returning today for recurrent episodes of spells of unknown etiology/seizure-like activity.  She has an ILR in place.  She has not had any further episodes of loss of consciousness.  We put her empirically on Keppra.    Previous diagnostics  MRI of the brain September 2022 age-related changes  Autonomic nervous system testing unremarkable  EEG unremarkable  Carotid Doppler September 21, 2022 unremarkable  Epilepsy monitoring unit where she stayed from January 23 through January 26, 2023 with fully normal interictal EEG.  No spells were captured.     Examination  BP (!) 121/56 (Site: Left Upper Arm, Position: Sitting)   Pulse 79   Resp 16   Ht 1.67 m (5' 5.75\")   Wt 73 kg (161 lb)

## 2025-02-14 ENCOUNTER — PREP FOR PROCEDURE (OUTPATIENT)
Age: 80
End: 2025-02-14

## 2025-02-14 ENCOUNTER — TELEPHONE (OUTPATIENT)
Age: 80
End: 2025-02-14

## 2025-02-14 NOTE — TELEPHONE ENCOUNTER
Patient would like a call back in regards to upcoming surgery she states she has questions in regards to surgery.     Phone # [phone]

## 2025-02-14 NOTE — TELEPHONE ENCOUNTER
Returned patient call, ID verified using two patient identifiers.  Verified that Ms. He's procedure is scheduled at [facility] on Friday, 2/21/25 at 1:00 pm.  She is arriving at 12:00 pm.  She can eat and drink and drive herself home as the procedure is done under local anesthesia.    Patient verbalized understanding and will call back with any other questions or concerns.    Future Appointments   Date Time Provider Department Center   5/28/2025  3:40 PM Adele Skinner MD CAVSF BS AMB   1/29/2026 11:30 AM Kristen Benito MD NEUROWRSPPBB BS AMB

## 2025-02-21 ENCOUNTER — HOSPITAL ENCOUNTER (OUTPATIENT)
Facility: HOSPITAL | Age: 80
Setting detail: OUTPATIENT SURGERY
Discharge: HOME | End: 2025-02-21
Attending: INTERNAL MEDICINE | Admitting: INTERNAL MEDICINE
Payer: MEDICARE

## 2025-02-21 DIAGNOSIS — Z95.818 STATUS POST PLACEMENT OF IMPLANTABLE LOOP RECORDER: ICD-10-CM

## 2025-02-21 PROCEDURE — 2709999900 HC NON-CHARGEABLE SUPPLY: Performed by: INTERNAL MEDICINE

## 2025-02-21 PROCEDURE — 6360000002 HC RX W HCPCS: Performed by: INTERNAL MEDICINE

## 2025-02-21 PROCEDURE — 33286 RMVL SUBQ CAR RHYTHM MNTR: CPT | Performed by: INTERNAL MEDICINE

## 2025-02-21 RX ORDER — LIDOCAINE HYDROCHLORIDE 10 MG/ML
INJECTION, SOLUTION INFILTRATION; PERINEURAL PRN
Status: DISCONTINUED | OUTPATIENT
Start: 2025-02-21 | End: 2025-02-21 | Stop reason: HOSPADM

## 2025-02-21 NOTE — DISCHARGE INSTRUCTIONS
Implanted Loop Recorder (Linq) Explant Discharge Instructions            MEDICATIONS        Take only the medications prescribed to you at discharge.    ACTIVITY        Return to your normal activity, except as noted below.    Avoid tight clothes or unnecessary pressure over your incision (such as bra straps or seat belts).  If it is tender or sensitive to clothing, cover the incision with a soft dressing or pad.  Questions about driving are individualized and should be discussed with one of the EP Physicians prior to discharge.      SHOWERING        Leave the bandage over your site after the Loop Recorder implant for 3 days.     It is important to keep the bandaged area clean and dry.  You may shower around the site while the bandage is in place.     Remove the top bandage after 3 days. You may also see some dried blood or scabbing, this is normal.    You may shower after the top bandage is removed, washing it gently with soap and water. Do not apply any lotions, powders, or perfumes to the incision line.    Avoid submerging your incision in water (tub baths, hot tubs, or swimming) for two weeks.         DISCHARGE PRECAUTIONS        Record your temperature every day, at the same time for 7 days.  A temperature of 100.5 F, or higher, can be the first sign of infection.  This should be reported to your Doctor immediately.          SYMPTOMS THAT NEED TO BE REPORTED IMMEDIATELY        Temperature more than 100.4 F    Redness or warmth at the incision site, or pain for longer than the first 5 days after the explant.    Drainage from the incision site.    Swelling around the incision site.    REMEMBER: If you feel something is an emergency or cannot be handled over the phone, call 911 or go to the closest emergency room.            Esequiel Skinner MD  Cardiac Electrophysiology / Cardiology      [facility] [place]  [facility]  [address] [facility], [address]

## 2025-02-21 NOTE — PROCEDURES
Loop Explant    Procedure Date: 02/21/25  Lab Physician: Adele Skinner MD    INDICATIONS:  History of syncope s/p ILR, now referred for loop explant.    COMMENTS:  Local anesthetic was delivered subcutaneously and a 1 inch horizontal incision was made directly over the LINQ device. The fibrous capsule was identified. The LINQ was easily removed from its pocket. The incision site was closed with a layer of running 4-0 Stratafix subcuticular stitch. Dermabond was applied over the incision. The wound was covered with a dry sterile gauze and Tegaderm. The patient tolerated the procedure well and left the laboratory in good condition.    CONCLUSION:  Successful explant of the Medtronic Reveal LINQ insertable cardiac monitor.

## 2025-02-21 NOTE — H&P
Office note from 2025 reviewed. No interim changes.   --------------------------------------------------------                               [facility]  Cardiac Electrophysiology Clinic Care Note                  []Initial visit     [x]Established visit     Patient Name: Reene He - :1945 - MRN:[id number]  Primary Cardiologist: Alexander Hooper MD  Electrophysiologist: Adele Skinner MD     Reason for visit: ILR follow up    HPI:  Ms. He is a 79 y.o. female who presents for follow up, is s/p [employer] ILR (DOI 2023) for recurrent syncope.    No events to date noted via ILR.    She denies chest pain, palpitations, SOB, or edema.  No recent recurrent syncope, states there's historically been no predictable frequency.    30 day monitor in  showed no AF, avg HR 63 with PACs & PVCs.    MRI brain in 2022 showed no significant acute abnormalities.    Carotid duplex in 2022 showed mild carotid stenosis.    BP controlled.      Previous:  [employer] ILR (DOI 2023-Susanne) implanted for syncope.    Underwent monitoring in epilepsy unit in 2023, showed normal interictal EEG.  No spells captured.  Dr. Randall started patient on Keppra.    Syncope while parking her car in 2022.  No warning.    Syncope in 2016, occurred while going down the stairs after watering plants, resulted in fracture of foot & knee injury.  Multiple recurrences over the years, tend to be while descending stairs, has sudden LOC & regains consciousness right before hitting the floor.    Remote history of [facility], diagnosed with coronary spasm.       Assessment and Plan       ICD-10-CM    1. Syncope, unspecified syncope type  R55       2. Implantable loop recorder present  Z95.818           Syncope: Recurrent, occurs without warning (see above).  No events noted via ILR.  Interictal EEG unremarkable in epilepsy monitoring unit, but Dr. Randall started Keppra.    [employer] ILR (2023): Implanted for recurrent syncope.  No  Alert, cooperative, no distress, appears stated age.   Neck:   Supple, no JVD.   Back:     Symmetric.   Lungs:     Clear to auscultation bilaterally.   Heart:    Regular rate and rhythm.  No murmur, click, rub or gallop.   Abdomen:     Soft, non-tender. Bowel sounds normal.    MSK:   Extremities normal, atraumatic.  Moves extremities independently.   Vasc/lymph:   No lower extremity edema.   Skin:   Skin color normal. No rashes or lesions on visible areas.   Neurologic:   Alert, moves all extremities.        Data Review:     Radiology:   XR Results (most recent):    CT Result (most recent):  No results found for this or any previous visit from the past 3650 days.    MRI Result (most recent):  MRI BRAIN W WO CONTRAST 09/02/2022    Narrative  Clinical history: Recurrent episodes of syncope  INDICATION:   Recurrent episodes of syncope    COMPARISON: None  TECHNIQUE: MR examination of the brain includes axial and sagittal T1 , axial  T2, axial FLAIR, axial gradient echo, axial DWI, coronal T1 . Pre and post  contrast axial T1-weighted imaging. Postcontrast T1-weighted imaging coronal  plane.    CONTRAST: 20 ml ProHance  FINDINGS:  There is no intracranial mass, hemorrhage or acute infarction.  There are confluent periventricular and scattered foci of increased T2 signal  intensity demonstrated in the corona radiata, centrum semiovale and central  berkley. There is minimal sulcal and ventricular prominence.  There is no abnormal parenchymal enhancement. There is no abnormal meningeal  enhancement demonstrated. The brain architecture is normal. There is no evidence  of midline shift or mass-effect. The ventricles are normal in size, position and  configuration.  There are no extra-axial fluid collections. Major intracranial  vascular flow-voids are unremarkable. The orbits are grossly symmetric. Dural  venous sinuses are grossly unremarkable. There is no Chiari or syrinx. Pituitary  and infundibulum grossly unremarkable.

## 2025-02-21 NOTE — PROGRESS NOTES
12:38 PM  Patient brought back from waiting area. ID band and allergies confirmed. Consents signed.     1:30 PM  Discharge instructions reviewed with patient and family. Voiced understanding. Patient given copy of discharge instructions to take home.          1:36 PM  Pt discharged via self on foot with daughterJanet. Personal belongings with patient upon discharge.

## 2025-03-17 PROCEDURE — 93298 REM INTERROG DEV EVAL SCRMS: CPT | Performed by: INTERNAL MEDICINE

## (undated) DEVICE — PAD N ADH W3XL4IN POLY COT SFT PERF FLM EASILY CUT ABSRB

## (undated) DEVICE — LIQUIBAND RAPID ADHESIVE 36/CS 0.8ML: Brand: MEDLINE

## (undated) DEVICE — SUTURE MONOCRYL STRATAFIX SPRL + SZ 4-0 L12IN ABSRB UD PS-2 SXMP1B117

## (undated) DEVICE — INTENDED FOR TISSUE SEPARATION, AND OTHER PROCEDURES THAT REQUIRE A SHARP SURGICAL BLADE TO PUNCTURE OR CUT.: Brand: BARD-PARKER ®  SAFETY SCALPED

## (undated) DEVICE — HYPODERMIC SAFETY NEEDLE: Brand: MAGELLAN

## (undated) DEVICE — GAUZE,SPONGE,AVANT,4"X4",4PLY,STRL,10/TR: Brand: MEDLINE